# Patient Record
Sex: FEMALE | Race: WHITE | NOT HISPANIC OR LATINO | Employment: PART TIME | URBAN - NONMETROPOLITAN AREA
[De-identification: names, ages, dates, MRNs, and addresses within clinical notes are randomized per-mention and may not be internally consistent; named-entity substitution may affect disease eponyms.]

---

## 2023-07-20 PROBLEM — N89.8 VAGINAL ITCHING: Status: ACTIVE | Noted: 2023-07-20

## 2023-07-20 PROBLEM — K21.9 GERD (GASTROESOPHAGEAL REFLUX DISEASE): Status: ACTIVE | Noted: 2023-07-20

## 2023-07-20 PROBLEM — G47.00 INSOMNIA: Status: ACTIVE | Noted: 2023-07-20

## 2023-07-20 PROBLEM — L29.9 PRURITUS: Status: ACTIVE | Noted: 2023-07-20

## 2023-07-20 PROBLEM — J30.2 SEASONAL ALLERGIES: Status: ACTIVE | Noted: 2023-07-20

## 2023-07-20 PROBLEM — I10 HTN (HYPERTENSION): Status: ACTIVE | Noted: 2023-07-20

## 2023-07-20 PROBLEM — F41.9 ANXIETY: Status: ACTIVE | Noted: 2023-07-20

## 2023-07-20 PROBLEM — J45.909 ASTHMA (HHS-HCC): Status: ACTIVE | Noted: 2023-07-20

## 2023-07-20 RX ORDER — NALTREXONE HYDROCHLORIDE 50 MG/1
1 TABLET, FILM COATED ORAL DAILY
COMMUNITY
Start: 2022-10-06 | End: 2023-09-22 | Stop reason: ALTCHOICE

## 2023-07-20 RX ORDER — PANTOPRAZOLE SODIUM 40 MG/1
1 TABLET, DELAYED RELEASE ORAL DAILY
COMMUNITY
Start: 2022-10-13 | End: 2023-08-11 | Stop reason: SDUPTHER

## 2023-07-20 RX ORDER — TRAZODONE HYDROCHLORIDE 50 MG/1
1 TABLET ORAL NIGHTLY
COMMUNITY
Start: 2022-10-13 | End: 2024-03-01 | Stop reason: SDUPTHER

## 2023-07-20 RX ORDER — LISINOPRIL 10 MG/1
1 TABLET ORAL 2 TIMES DAILY
COMMUNITY
Start: 2022-10-13 | End: 2023-08-11 | Stop reason: SDUPTHER

## 2023-07-20 RX ORDER — MULTIVITAMIN
1 TABLET ORAL DAILY
COMMUNITY
Start: 2022-10-13

## 2023-07-20 RX ORDER — MULTIVIT WITH MINERALS/HERBS
1 TABLET ORAL DAILY
COMMUNITY
Start: 2022-10-13

## 2023-07-20 RX ORDER — TRIAMCINOLONE ACETONIDE 1 MG/G
CREAM TOPICAL 3 TIMES DAILY
COMMUNITY
Start: 2022-10-13

## 2023-08-09 LAB
ALANINE AMINOTRANSFERASE (SGPT) (U/L) IN SER/PLAS: 14 U/L (ref 7–45)
ALBUMIN (G/DL) IN SER/PLAS: 4.1 G/DL (ref 3.4–5)
ALKALINE PHOSPHATASE (U/L) IN SER/PLAS: 81 U/L (ref 33–110)
ANION GAP IN SER/PLAS: 12 MMOL/L (ref 10–20)
ASPARTATE AMINOTRANSFERASE (SGOT) (U/L) IN SER/PLAS: 17 U/L (ref 9–39)
BILIRUBIN TOTAL (MG/DL) IN SER/PLAS: 0.6 MG/DL (ref 0–1.2)
CALCIUM (MG/DL) IN SER/PLAS: 9.1 MG/DL (ref 8.6–10.3)
CARBON DIOXIDE, TOTAL (MMOL/L) IN SER/PLAS: 25 MMOL/L (ref 21–32)
CHLORIDE (MMOL/L) IN SER/PLAS: 102 MMOL/L (ref 98–107)
CREATININE (MG/DL) IN SER/PLAS: 0.74 MG/DL (ref 0.5–1.05)
GFR FEMALE: >90 ML/MIN/1.73M2
GLUCOSE (MG/DL) IN SER/PLAS: 126 MG/DL (ref 74–99)
POTASSIUM (MMOL/L) IN SER/PLAS: 4 MMOL/L (ref 3.5–5.3)
PROTEIN TOTAL: 6 G/DL (ref 6.4–8.2)
SODIUM (MMOL/L) IN SER/PLAS: 135 MMOL/L (ref 136–145)
UREA NITROGEN (MG/DL) IN SER/PLAS: 7 MG/DL (ref 6–23)

## 2023-08-11 ENCOUNTER — OFFICE VISIT (OUTPATIENT)
Dept: PRIMARY CARE | Facility: CLINIC | Age: 58
End: 2023-08-11
Payer: COMMERCIAL

## 2023-08-11 VITALS
WEIGHT: 166.6 LBS | HEIGHT: 68 IN | BODY MASS INDEX: 25.25 KG/M2 | HEART RATE: 78 BPM | DIASTOLIC BLOOD PRESSURE: 70 MMHG | OXYGEN SATURATION: 96 % | SYSTOLIC BLOOD PRESSURE: 110 MMHG

## 2023-08-11 DIAGNOSIS — K91.2 POSTSURGICAL MALABSORPTION (HHS-HCC): ICD-10-CM

## 2023-08-11 DIAGNOSIS — F41.9 ANXIETY: ICD-10-CM

## 2023-08-11 DIAGNOSIS — K21.9 GASTROESOPHAGEAL REFLUX DISEASE WITHOUT ESOPHAGITIS: ICD-10-CM

## 2023-08-11 DIAGNOSIS — M25.59 PAIN IN OTHER JOINT: ICD-10-CM

## 2023-08-11 DIAGNOSIS — J45.40 MODERATE PERSISTENT ASTHMA WITHOUT COMPLICATION (HHS-HCC): Primary | ICD-10-CM

## 2023-08-11 DIAGNOSIS — I10 PRIMARY HYPERTENSION: ICD-10-CM

## 2023-08-11 PROBLEM — N89.8 VAGINAL ITCHING: Status: RESOLVED | Noted: 2023-07-20 | Resolved: 2023-08-11

## 2023-08-11 PROBLEM — L29.9 PRURITUS: Status: RESOLVED | Noted: 2023-07-20 | Resolved: 2023-08-11

## 2023-08-11 PROCEDURE — 99214 OFFICE O/P EST MOD 30 MIN: CPT | Performed by: FAMILY MEDICINE

## 2023-08-11 PROCEDURE — 3074F SYST BP LT 130 MM HG: CPT | Performed by: FAMILY MEDICINE

## 2023-08-11 PROCEDURE — 1036F TOBACCO NON-USER: CPT | Performed by: FAMILY MEDICINE

## 2023-08-11 PROCEDURE — 3078F DIAST BP <80 MM HG: CPT | Performed by: FAMILY MEDICINE

## 2023-08-11 RX ORDER — LORAZEPAM 0.5 MG/1
0.5 TABLET ORAL AS NEEDED
COMMUNITY
Start: 2023-06-16 | End: 2024-05-17 | Stop reason: ALTCHOICE

## 2023-08-11 RX ORDER — FLUTICASONE FUROATE AND VILANTEROL 200; 25 UG/1; UG/1
1 POWDER RESPIRATORY (INHALATION) DAILY
Qty: 1 EACH | Refills: 11 | Status: SHIPPED | OUTPATIENT
Start: 2023-08-11 | End: 2023-10-12

## 2023-08-11 RX ORDER — BUPROPION HYDROCHLORIDE 200 MG/1
200 TABLET, EXTENDED RELEASE ORAL
COMMUNITY
Start: 2023-08-07 | End: 2024-03-01 | Stop reason: SDUPTHER

## 2023-08-11 RX ORDER — PREDNISONE 20 MG/1
40 TABLET ORAL DAILY
Qty: 10 TABLET | Refills: 0 | Status: SHIPPED | OUTPATIENT
Start: 2023-08-11 | End: 2023-08-16

## 2023-08-11 RX ORDER — PANTOPRAZOLE SODIUM 40 MG/1
40 TABLET, DELAYED RELEASE ORAL DAILY
Qty: 90 TABLET | Refills: 3 | Status: SHIPPED | OUTPATIENT
Start: 2023-08-11 | End: 2024-03-01 | Stop reason: SDUPTHER

## 2023-08-11 RX ORDER — LISINOPRIL 20 MG/1
20 TABLET ORAL DAILY
Qty: 90 TABLET | Refills: 3 | Status: SHIPPED | OUTPATIENT
Start: 2023-08-11 | End: 2024-03-01 | Stop reason: SDUPTHER

## 2023-08-11 ASSESSMENT — ENCOUNTER SYMPTOMS
DIARRHEA: 0
COUGH: 1
HEADACHES: 0
DIFFICULTY URINATING: 0
NUMBNESS: 0
WHEEZING: 1
ACTIVITY CHANGE: 0
DIZZINESS: 0
VOMITING: 0
ABDOMINAL PAIN: 0
NAUSEA: 0
LIGHT-HEADEDNESS: 0
SHORTNESS OF BREATH: 1
CONSTIPATION: 0
UNEXPECTED WEIGHT CHANGE: 0
FATIGUE: 0
ADENOPATHY: 0
TROUBLE SWALLOWING: 0
APPETITE CHANGE: 0
RHINORRHEA: 0
ARTHRALGIAS: 1
PALPITATIONS: 0
ABDOMINAL DISTENTION: 0

## 2023-08-11 NOTE — PROGRESS NOTES
"Subjective   Patient ID: Jennifer Perla is a 57 y.o. female who presents for 6 MO Labs.    HPI   No headache, shortness of breath, dizziness, lightheadedness, or edema  Taking PPI daily without breakthrough symptoms.  Reviewed dietary, caffeine, tobacco, alcohol, and NSAID use.  No dyspepsia, dysphagia, reflux, melena, or abdominal pain.    Seeing psych NP, emotionally doing well, no ETOH  No HBP, occ CP, using albuterol more than 2 times a week, daily, no nasal congestion, occ Allegra  No change in exercise tolerance, near syncope at times (once), has noticed in the past couple of months  Patient having multiple joint aches and pains stiffness and soreness.    Review of Systems   Constitutional:  Negative for activity change, appetite change, fatigue and unexpected weight change.   HENT:  Negative for ear pain, nosebleeds, rhinorrhea, sneezing and trouble swallowing.    Respiratory:  Positive for cough, shortness of breath and wheezing.    Cardiovascular:  Negative for chest pain, palpitations and leg swelling.   Gastrointestinal:  Negative for abdominal distention, abdominal pain, constipation, diarrhea, nausea and vomiting.   Genitourinary:  Negative for difficulty urinating.   Musculoskeletal:  Positive for arthralgias.   Skin:  Negative for rash.   Neurological:  Negative for dizziness, light-headedness, numbness and headaches.   Hematological:  Negative for adenopathy.   Psychiatric/Behavioral:  Negative for behavioral problems.    All other systems reviewed and are negative.      Objective   /70   Pulse 78   Ht 1.715 m (5' 7.5\")   Wt 75.6 kg (166 lb 9.6 oz)   SpO2 96%   BMI 25.71 kg/m²     Physical Exam  Vitals and nursing note reviewed.   Constitutional:       General: She is not in acute distress.     Appearance: Normal appearance. She is not toxic-appearing.   HENT:      Head: Normocephalic and atraumatic.      Right Ear: Tympanic membrane, ear canal and external ear normal.      Left Ear: " Tympanic membrane, ear canal and external ear normal.      Nose: Nose normal.      Mouth/Throat:      Mouth: Mucous membranes are moist.      Pharynx: Oropharynx is clear.   Eyes:      Extraocular Movements: Extraocular movements intact.      Conjunctiva/sclera: Conjunctivae normal.      Pupils: Pupils are equal, round, and reactive to light.   Cardiovascular:      Rate and Rhythm: Normal rate and regular rhythm.      Pulses: Normal pulses.      Heart sounds: Normal heart sounds.   Pulmonary:      Effort: Pulmonary effort is normal.      Breath sounds: Normal breath sounds.   Abdominal:      General: Abdomen is flat. Bowel sounds are normal.      Palpations: Abdomen is soft.   Musculoskeletal:      Cervical back: Normal range of motion and neck supple.   Skin:     General: Skin is warm and dry.      Capillary Refill: Capillary refill takes less than 2 seconds.   Neurological:      General: No focal deficit present.      Mental Status: She is alert and oriented to person, place, and time. Mental status is at baseline.   Psychiatric:         Mood and Affect: Mood normal.         Behavior: Behavior normal.         Assessment/Plan   Problem List Items Addressed This Visit       Anxiety     Follows with nurse practitioner, maintained on current medication no longer using alcohol for the past 3 months.         Asthma - Primary     Symptoms more active lately, prednisone for 5 days started on Breo Ellipta and recheck again in 1 month.         Relevant Medications    fluticasone furoate-vilanteroL (Breo Ellipta) 200-25 mcg/dose inhaler    predniSONE (Deltasone) 20 mg tablet    GERD (gastroesophageal reflux disease)     Stable with medication.         Relevant Medications    pantoprazole (ProtoNix) 40 mg EC tablet    HTN (hypertension)     Blood pressure under good control, sodium level slightly low, patient concerned about history of hyponatremia, recheck again in 1 month.  Advised about fluid restriction, patient drinking  a large amount of soda pop through the day, encouraged to drink more clear liquids.         Relevant Medications    lisinopril 20 mg tablet    Other Relevant Orders    Comprehensive Metabolic Panel    Postsurgical malabsorption     Check blood testing in the next month.         Relevant Orders    Comprehensive Metabolic Panel    CBC    Vitamin B12     Other Visit Diagnoses       Pain in other joint        Check labs advised about Tylenol, avoid nonsteroidal anti-inflammatories given history of gastric bypass surgery.    Relevant Orders    Rheumatoid Factor    Citrulline Antibody, IgG    Sedimentation Rate    C-Reactive Protein    GOMEZ with Reflex to EBONI

## 2023-08-11 NOTE — ASSESSMENT & PLAN NOTE
Symptoms more active lately, prednisone for 5 days started on Breo Ellipta and recheck again in 1 month.

## 2023-08-11 NOTE — ASSESSMENT & PLAN NOTE
Follows with nurse practitioner, maintained on current medication no longer using alcohol for the past 3 months.

## 2023-08-11 NOTE — ASSESSMENT & PLAN NOTE
Blood pressure under good control, sodium level slightly low, patient concerned about history of hyponatremia, recheck again in 1 month.  Advised about fluid restriction, patient drinking a large amount of soda pop through the day, encouraged to drink more clear liquids.

## 2023-09-21 ENCOUNTER — OFFICE VISIT (OUTPATIENT)
Dept: PRIMARY CARE | Facility: CLINIC | Age: 58
End: 2023-09-21
Payer: COMMERCIAL

## 2023-09-21 ENCOUNTER — LAB (OUTPATIENT)
Dept: LAB | Facility: LAB | Age: 58
End: 2023-09-21
Payer: COMMERCIAL

## 2023-09-21 VITALS
HEART RATE: 71 BPM | BODY MASS INDEX: 25.88 KG/M2 | WEIGHT: 170.8 LBS | SYSTOLIC BLOOD PRESSURE: 130 MMHG | DIASTOLIC BLOOD PRESSURE: 80 MMHG | OXYGEN SATURATION: 97 % | HEIGHT: 68 IN

## 2023-09-21 DIAGNOSIS — R10.9 FLANK PAIN: Primary | ICD-10-CM

## 2023-09-21 DIAGNOSIS — R10.9 FLANK PAIN: ICD-10-CM

## 2023-09-21 DIAGNOSIS — K91.2 POSTSURGICAL MALABSORPTION (HHS-HCC): ICD-10-CM

## 2023-09-21 DIAGNOSIS — M25.59 PAIN IN OTHER JOINT: ICD-10-CM

## 2023-09-21 DIAGNOSIS — I10 PRIMARY HYPERTENSION: ICD-10-CM

## 2023-09-21 DIAGNOSIS — N30.01 ACUTE CYSTITIS WITH HEMATURIA: ICD-10-CM

## 2023-09-21 LAB
ALANINE AMINOTRANSFERASE (SGPT) (U/L) IN SER/PLAS: 14 U/L (ref 7–45)
ALBUMIN (G/DL) IN SER/PLAS: 4.3 G/DL (ref 3.4–5)
ALKALINE PHOSPHATASE (U/L) IN SER/PLAS: 78 U/L (ref 33–110)
ANION GAP IN SER/PLAS: 12 MMOL/L (ref 10–20)
ASPARTATE AMINOTRANSFERASE (SGOT) (U/L) IN SER/PLAS: 18 U/L (ref 9–39)
BILIRUBIN TOTAL (MG/DL) IN SER/PLAS: 0.6 MG/DL (ref 0–1.2)
C REACTIVE PROTEIN (MG/L) IN SER/PLAS: <0.1 MG/DL
CALCIUM (MG/DL) IN SER/PLAS: 9.3 MG/DL (ref 8.6–10.3)
CARBON DIOXIDE, TOTAL (MMOL/L) IN SER/PLAS: 25 MMOL/L (ref 21–32)
CHLORIDE (MMOL/L) IN SER/PLAS: 101 MMOL/L (ref 98–107)
COBALAMIN (VITAMIN B12) (PG/ML) IN SER/PLAS: 513 PG/ML (ref 211–911)
CREATININE (MG/DL) IN SER/PLAS: 0.73 MG/DL (ref 0.5–1.05)
ERYTHROCYTE DISTRIBUTION WIDTH (RATIO) BY AUTOMATED COUNT: 12.6 % (ref 11.5–14.5)
ERYTHROCYTE MEAN CORPUSCULAR HEMOGLOBIN CONCENTRATION (G/DL) BY AUTOMATED: 32.6 G/DL (ref 32–36)
ERYTHROCYTE MEAN CORPUSCULAR VOLUME (FL) BY AUTOMATED COUNT: 96 FL (ref 80–100)
ERYTHROCYTES (10*6/UL) IN BLOOD BY AUTOMATED COUNT: 4.3 X10E12/L (ref 4–5.2)
GFR FEMALE: >90 ML/MIN/1.73M2
GLUCOSE (MG/DL) IN SER/PLAS: 141 MG/DL (ref 74–99)
HEMATOCRIT (%) IN BLOOD BY AUTOMATED COUNT: 41.4 % (ref 36–46)
HEMOGLOBIN (G/DL) IN BLOOD: 13.5 G/DL (ref 12–16)
LEUKOCYTES (10*3/UL) IN BLOOD BY AUTOMATED COUNT: 3.6 X10E9/L (ref 4.4–11.3)
PLATELETS (10*3/UL) IN BLOOD AUTOMATED COUNT: 186 X10E9/L (ref 150–450)
POC APPEARANCE, URINE: ABNORMAL
POC BILIRUBIN, URINE: NEGATIVE
POC BLOOD, URINE: ABNORMAL
POC COLOR, URINE: ABNORMAL
POC GLUCOSE, URINE: NEGATIVE MG/DL
POC KETONES, URINE: NEGATIVE MG/DL
POC LEUKOCYTES, URINE: ABNORMAL
POC NITRITE,URINE: NEGATIVE
POC PH, URINE: 6 PH
POC PROTEIN, URINE: NEGATIVE MG/DL
POC SPECIFIC GRAVITY, URINE: 1.01
POC UROBILINOGEN, URINE: 0.2 EU/DL
POTASSIUM (MMOL/L) IN SER/PLAS: 4.2 MMOL/L (ref 3.5–5.3)
PROTEIN TOTAL: 6.3 G/DL (ref 6.4–8.2)
SEDIMENTATION RATE, ERYTHROCYTE: 5 MM/H (ref 0–30)
SODIUM (MMOL/L) IN SER/PLAS: 134 MMOL/L (ref 136–145)
UREA NITROGEN (MG/DL) IN SER/PLAS: 8 MG/DL (ref 6–23)

## 2023-09-21 PROCEDURE — 81003 URINALYSIS AUTO W/O SCOPE: CPT | Performed by: NURSE PRACTITIONER

## 2023-09-21 PROCEDURE — 85652 RBC SED RATE AUTOMATED: CPT

## 2023-09-21 PROCEDURE — 86200 CCP ANTIBODY: CPT

## 2023-09-21 PROCEDURE — 87077 CULTURE AEROBIC IDENTIFY: CPT

## 2023-09-21 PROCEDURE — 82607 VITAMIN B-12: CPT

## 2023-09-21 PROCEDURE — 86431 RHEUMATOID FACTOR QUANT: CPT

## 2023-09-21 PROCEDURE — 3079F DIAST BP 80-89 MM HG: CPT | Performed by: NURSE PRACTITIONER

## 2023-09-21 PROCEDURE — 99214 OFFICE O/P EST MOD 30 MIN: CPT | Performed by: NURSE PRACTITIONER

## 2023-09-21 PROCEDURE — 86038 ANTINUCLEAR ANTIBODIES: CPT

## 2023-09-21 PROCEDURE — 87186 SC STD MICRODIL/AGAR DIL: CPT

## 2023-09-21 PROCEDURE — 1036F TOBACCO NON-USER: CPT | Performed by: NURSE PRACTITIONER

## 2023-09-21 PROCEDURE — 87086 URINE CULTURE/COLONY COUNT: CPT

## 2023-09-21 PROCEDURE — 80053 COMPREHEN METABOLIC PANEL: CPT

## 2023-09-21 PROCEDURE — 3075F SYST BP GE 130 - 139MM HG: CPT | Performed by: NURSE PRACTITIONER

## 2023-09-21 PROCEDURE — 36415 COLL VENOUS BLD VENIPUNCTURE: CPT

## 2023-09-21 PROCEDURE — 85027 COMPLETE CBC AUTOMATED: CPT

## 2023-09-21 PROCEDURE — 86140 C-REACTIVE PROTEIN: CPT

## 2023-09-21 RX ORDER — NITROFURANTOIN 25; 75 MG/1; MG/1
100 CAPSULE ORAL 2 TIMES DAILY
Qty: 14 CAPSULE | Refills: 0 | Status: SHIPPED | OUTPATIENT
Start: 2023-09-21 | End: 2023-09-25

## 2023-09-21 ASSESSMENT — ENCOUNTER SYMPTOMS
DYSURIA: 1
HEMATURIA: 0
CHILLS: 0
PARESIS: 0
CONSTIPATION: 0
VOMITING: 0
PERIANAL NUMBNESS: 0
NAUSEA: 0
FLANK PAIN: 1
DIARRHEA: 0
DIFFICULTY URINATING: 0
FEVER: 0
ABDOMINAL PAIN: 1

## 2023-09-21 ASSESSMENT — PATIENT HEALTH QUESTIONNAIRE - PHQ9
1. LITTLE INTEREST OR PLEASURE IN DOING THINGS: NOT AT ALL
2. FEELING DOWN, DEPRESSED OR HOPELESS: NOT AT ALL
SUM OF ALL RESPONSES TO PHQ9 QUESTIONS 1 AND 2: 0

## 2023-09-21 NOTE — PROGRESS NOTES
"Subjective   Patient ID: Jennifer Perla is a 57 y.o. female who presents for possible kidney infection (Pain started Monday night, dull ache since july).    Ache lower back started in July and by Mon night started to worsen on R flank region w/ radiation to R lower abd  No h/o renal stones  -hematuria  No fever/chills/nausea/diarrhea/vomiting  Hard to start urine stream  No chg in freq./  IO UA: blood trace  Leuk: large    Flank Pain  This is a new problem. The current episode started more than 1 month ago. The problem occurs constantly. The problem has been gradually worsening since onset. Pain location: R flank. The quality of the pain is described as stabbing. Radiates to: RLQ. The pain is moderate. The pain is The same all the time. Exacerbated by: nothing. Associated symptoms include abdominal pain, dysuria and pelvic pain. Pertinent negatives include no bladder incontinence, fever, paresis or perianal numbness. She has tried analgesics for the symptoms. The treatment provided no relief.        Review of Systems   Constitutional:  Negative for chills and fever.   Gastrointestinal:  Positive for abdominal pain. Negative for constipation, diarrhea, nausea and vomiting.   Genitourinary:  Positive for dysuria, flank pain and pelvic pain. Negative for bladder incontinence, difficulty urinating, hematuria, vaginal bleeding, vaginal discharge and vaginal pain.       Objective   /80   Pulse 71   Ht 1.715 m (5' 7.5\")   Wt 77.5 kg (170 lb 12.8 oz)   SpO2 97%   BMI 26.36 kg/m²     Physical Exam  Vitals reviewed.   Constitutional:       General: She is in acute distress.      Appearance: She is not ill-appearing, toxic-appearing or diaphoretic.   Cardiovascular:      Rate and Rhythm: Normal rate and regular rhythm.      Heart sounds: Normal heart sounds.   Pulmonary:      Effort: Pulmonary effort is normal.      Breath sounds: Normal breath sounds.   Abdominal:      General: Abdomen is flat. Bowel sounds are " decreased.      Palpations: Abdomen is soft.      Tenderness: There is abdominal tenderness in the right lower quadrant. There is no right CVA tenderness or left CVA tenderness.   Musculoskeletal:      Right lower leg: No edema.      Left lower leg: No edema.   Neurological:      Mental Status: She is alert.   Psychiatric:         Behavior: Behavior is cooperative.         Assessment/Plan   Problem List Items Addressed This Visit       Acute cystitis with hematuria    Relevant Medications    nitrofurantoin, macrocrystal-monohydrate, (Macrobid) 100 mg capsule    Other Relevant Orders    Urine Culture    POCT UA Automated manually resulted (Completed)    Flank pain - Primary    Relevant Medications    nitrofurantoin, macrocrystal-monohydrate, (Macrobid) 100 mg capsule    Other Relevant Orders    CT abdomen pelvis wo IV contrast    Basic Metabolic Panel    Urine Culture    CBC

## 2023-09-21 NOTE — RESULT ENCOUNTER NOTE
Called and spoke with patient regarding her CT of the abdomen and pelvis.  I did propose an order for a renal ultrasound which will need completed in approximately 2 weeks.  Have her take Anoro and may alternate with ibuprofen for pain.  With the naltrexone it blocks the effects of any narcotic that I would get rendering it ineffective.

## 2023-09-22 ENCOUNTER — TELEPHONE (OUTPATIENT)
Dept: PRIMARY CARE | Facility: CLINIC | Age: 58
End: 2023-09-22

## 2023-09-22 DIAGNOSIS — R10.9 FLANK PAIN: Primary | ICD-10-CM

## 2023-09-22 LAB
ANTI-NUCLEAR ANTIBODY (ANA): NEGATIVE
CITRULLINE ANTIBODY, IGG: <1 U/ML
RHEUMATOID FACTOR (IU/ML) IN SERUM OR PLASMA: <10 IU/ML (ref 0–15)

## 2023-09-22 RX ORDER — OXYCODONE AND ACETAMINOPHEN 5; 325 MG/1; MG/1
1 TABLET ORAL EVERY 6 HOURS PRN
Qty: 28 TABLET | Refills: 0 | Status: SHIPPED | OUTPATIENT
Start: 2023-09-22 | End: 2023-09-29

## 2023-09-22 NOTE — TELEPHONE ENCOUNTER
Prescription for percocet sent to pharmacy. If pain not under control w/ this medication or is accompanied w/ fever/chilling/nausea/vomiting needs evaluated in ER

## 2023-09-22 NOTE — TELEPHONE ENCOUNTER
Pt called in very tearful and upset with her pain level, she states she is no longer taking naltrexone and requests something stronger for pain as ibu and tylenol are not helping

## 2023-09-23 LAB — URINE CULTURE: ABNORMAL

## 2023-09-25 ENCOUNTER — TELEPHONE (OUTPATIENT)
Dept: PRIMARY CARE | Facility: CLINIC | Age: 58
End: 2023-09-25

## 2023-09-25 DIAGNOSIS — N30.01 ACUTE CYSTITIS WITH HEMATURIA: Primary | ICD-10-CM

## 2023-09-25 RX ORDER — AMOXICILLIN AND CLAVULANATE POTASSIUM 875; 125 MG/1; MG/1
875 TABLET, FILM COATED ORAL
Qty: 14 TABLET | Refills: 0 | Status: SHIPPED | OUTPATIENT
Start: 2023-09-25 | End: 2023-10-02

## 2023-09-25 NOTE — RESULT ENCOUNTER NOTE
I will place her on Augmentin to treat her UTI.  There is a higher risk with this antibiotic with C. difficile.  I encouraged her to try a probiotic which may help.

## 2023-09-25 NOTE — RESULT ENCOUNTER NOTE
: Spoke with patient regarding her urine culture results.  States her pain is much better and tolerable.  She denies fever or chilling.  She will call with a phone number for a pharmacy so I can send an antibiotic to.  She is currently on vacation in [facility].  She will also make an appointment to be seen by Dr. Black to review her labs and renal ultrasound results.

## 2023-10-04 ENCOUNTER — TELEPHONE (OUTPATIENT)
Dept: PRIMARY CARE | Facility: CLINIC | Age: 58
End: 2023-10-04

## 2023-10-04 NOTE — TELEPHONE ENCOUNTER
Pt lmom that she is still having pain after finishing antibiotics. Pt wonders if she should come in for a nurse visit to do a urine. Pt has an appt 10/12

## 2023-10-05 ENCOUNTER — CLINICAL SUPPORT (OUTPATIENT)
Dept: PRIMARY CARE | Facility: CLINIC | Age: 58
End: 2023-10-05
Payer: COMMERCIAL

## 2023-10-05 DIAGNOSIS — R10.9 FLANK PAIN: ICD-10-CM

## 2023-10-05 LAB
POC APPEARANCE, URINE: CLEAR
POC BILIRUBIN, URINE: NEGATIVE
POC BLOOD, URINE: ABNORMAL
POC COLOR, URINE: YELLOW
POC GLUCOSE, URINE: NEGATIVE MG/DL
POC KETONES, URINE: NEGATIVE MG/DL
POC LEUKOCYTES, URINE: ABNORMAL
POC NITRITE,URINE: NEGATIVE
POC PH, URINE: 6 PH
POC PROTEIN, URINE: NEGATIVE MG/DL
POC SPECIFIC GRAVITY, URINE: 1.01
POC UROBILINOGEN, URINE: 0.2 EU/DL

## 2023-10-05 PROCEDURE — 87086 URINE CULTURE/COLONY COUNT: CPT

## 2023-10-05 PROCEDURE — 81003 URINALYSIS AUTO W/O SCOPE: CPT | Performed by: FAMILY MEDICINE

## 2023-10-05 NOTE — PROGRESS NOTES
Patient ID: Jennifer Perla is a 58 y.o. female.    ProceduresSubjective   Patient ID: Jennifer Perla is a 58 y.o. female who presents for No chief complaint on file..    HPI     Review of Systems    Objective   There were no vitals taken for this visit.    Physical Exam    Assessment/Plan

## 2023-10-05 NOTE — PROGRESS NOTES
Pt wants to make sure that you are aware that she is susceptible to C diff. Patient brought a urine sample into the office today for Flank pain. Clarice Gonzalez ran urine test strip for POCT UA. Results forwarded to PCP.

## 2023-10-06 LAB — BACTERIA UR CULT: NORMAL

## 2023-10-12 ENCOUNTER — OFFICE VISIT (OUTPATIENT)
Dept: PRIMARY CARE | Facility: CLINIC | Age: 58
End: 2023-10-12
Payer: COMMERCIAL

## 2023-10-12 ENCOUNTER — LAB (OUTPATIENT)
Dept: LAB | Facility: LAB | Age: 58
End: 2023-10-12
Payer: COMMERCIAL

## 2023-10-12 ENCOUNTER — TELEPHONE (OUTPATIENT)
Dept: PRIMARY CARE | Facility: CLINIC | Age: 58
End: 2023-10-12

## 2023-10-12 ENCOUNTER — HOSPITAL ENCOUNTER (OUTPATIENT)
Dept: RADIOLOGY | Facility: HOSPITAL | Age: 58
Discharge: HOME | End: 2023-10-12
Payer: COMMERCIAL

## 2023-10-12 VITALS
HEART RATE: 71 BPM | WEIGHT: 175.8 LBS | DIASTOLIC BLOOD PRESSURE: 70 MMHG | HEIGHT: 68 IN | BODY MASS INDEX: 26.64 KG/M2 | SYSTOLIC BLOOD PRESSURE: 110 MMHG | OXYGEN SATURATION: 96 %

## 2023-10-12 DIAGNOSIS — I10 PRIMARY HYPERTENSION: Primary | ICD-10-CM

## 2023-10-12 DIAGNOSIS — J30.2 SEASONAL ALLERGIES: ICD-10-CM

## 2023-10-12 DIAGNOSIS — K86.89 PANCREATIC MASS (HHS-HCC): ICD-10-CM

## 2023-10-12 DIAGNOSIS — F51.01 PRIMARY INSOMNIA: ICD-10-CM

## 2023-10-12 DIAGNOSIS — R10.9 FLANK PAIN: ICD-10-CM

## 2023-10-12 DIAGNOSIS — I10 PRIMARY HYPERTENSION: ICD-10-CM

## 2023-10-12 DIAGNOSIS — K91.2 POSTSURGICAL MALABSORPTION (HHS-HCC): ICD-10-CM

## 2023-10-12 DIAGNOSIS — K21.9 GASTROESOPHAGEAL REFLUX DISEASE WITHOUT ESOPHAGITIS: ICD-10-CM

## 2023-10-12 DIAGNOSIS — M25.50 ARTHRALGIA, UNSPECIFIED JOINT: ICD-10-CM

## 2023-10-12 DIAGNOSIS — F41.9 ANXIETY: ICD-10-CM

## 2023-10-12 DIAGNOSIS — J45.20 MILD INTERMITTENT ASTHMA WITHOUT COMPLICATION (HHS-HCC): ICD-10-CM

## 2023-10-12 PROBLEM — N30.01 ACUTE CYSTITIS WITH HEMATURIA: Status: RESOLVED | Noted: 2023-09-21 | Resolved: 2023-10-12

## 2023-10-12 LAB
ALBUMIN SERPL BCP-MCNC: 4.1 G/DL (ref 3.4–5)
ALP SERPL-CCNC: 76 U/L (ref 33–136)
ALT SERPL W P-5'-P-CCNC: 14 U/L (ref 7–45)
ANION GAP SERPL CALC-SCNC: 9 MMOL/L (ref 10–20)
AST SERPL W P-5'-P-CCNC: 16 U/L (ref 9–39)
BILIRUB SERPL-MCNC: 0.6 MG/DL (ref 0–1.2)
BUN SERPL-MCNC: 8 MG/DL (ref 6–23)
CALCIUM SERPL-MCNC: 9 MG/DL (ref 8.6–10.3)
CANCER AG19-9 SERPL-ACNC: 11.66 U/ML
CHLORIDE SERPL-SCNC: 101 MMOL/L (ref 98–107)
CO2 SERPL-SCNC: 28 MMOL/L (ref 21–32)
CREAT SERPL-MCNC: 0.63 MG/DL (ref 0.5–1.05)
GFR SERPL CREATININE-BSD FRML MDRD: >90 ML/MIN/1.73M*2
GLUCOSE SERPL-MCNC: 87 MG/DL (ref 74–99)
POTASSIUM SERPL-SCNC: 4.7 MMOL/L (ref 3.5–5.3)
PROT SERPL-MCNC: 6.2 G/DL (ref 6.4–8.2)
SODIUM SERPL-SCNC: 133 MMOL/L (ref 136–145)

## 2023-10-12 PROCEDURE — 36415 COLL VENOUS BLD VENIPUNCTURE: CPT

## 2023-10-12 PROCEDURE — 1036F TOBACCO NON-USER: CPT | Performed by: FAMILY MEDICINE

## 2023-10-12 PROCEDURE — 86301 IMMUNOASSAY TUMOR CA 19-9: CPT

## 2023-10-12 PROCEDURE — 76770 US EXAM ABDO BACK WALL COMP: CPT

## 2023-10-12 PROCEDURE — 99214 OFFICE O/P EST MOD 30 MIN: CPT | Performed by: FAMILY MEDICINE

## 2023-10-12 PROCEDURE — 80053 COMPREHEN METABOLIC PANEL: CPT

## 2023-10-12 PROCEDURE — 76770 US EXAM ABDO BACK WALL COMP: CPT | Performed by: RADIOLOGY

## 2023-10-12 PROCEDURE — 3078F DIAST BP <80 MM HG: CPT | Performed by: FAMILY MEDICINE

## 2023-10-12 PROCEDURE — 3074F SYST BP LT 130 MM HG: CPT | Performed by: FAMILY MEDICINE

## 2023-10-12 ASSESSMENT — ENCOUNTER SYMPTOMS
LIGHT-HEADEDNESS: 0
VOMITING: 0
APPETITE CHANGE: 0
CONSTIPATION: 0
ABDOMINAL PAIN: 1
FATIGUE: 1
DECREASED CONCENTRATION: 0
SHORTNESS OF BREATH: 0
TROUBLE SWALLOWING: 0
DIARRHEA: 0
PALPITATIONS: 0
NUMBNESS: 0
DYSPHORIC MOOD: 0
DIFFICULTY URINATING: 0
RHINORRHEA: 0
ADENOPATHY: 0
WHEEZING: 0
ABDOMINAL DISTENTION: 0
DIZZINESS: 0
NERVOUS/ANXIOUS: 0
ARTHRALGIAS: 1
ACTIVITY CHANGE: 0
COUGH: 0
SLEEP DISTURBANCE: 0
HEADACHES: 0
UNEXPECTED WEIGHT CHANGE: 0
NAUSEA: 0

## 2023-10-12 NOTE — ASSESSMENT & PLAN NOTE
Seems to be doing well, was unable to afford Breo elliptica, asthma does seem to be settled after prednisone taper.

## 2023-10-12 NOTE — PROGRESS NOTES
Subjective   Patient ID: Jennifer Perla is a 58 y.o. female who presents for 1 MO LABS.    HPI   No headache, chest pain, shortness of breath, dizziness, lightheadedness, or edema  Has not been using Breo, not using albuterol as much  Joint pain, no different with steroids, feels stiff and sore, usually worse when less active  Right flank pain since July  Taking PPI daily without breakthrough symptoms.  Reviewed dietary, caffeine, tobacco, alcohol, and NSAID use.  No dyspepsia, dysphagia, reflux, melena, or abdominal pain.  Emotionally doing OK, sleeping OK at night    Review of Systems   Constitutional:  Positive for fatigue. Negative for activity change, appetite change and unexpected weight change.   HENT:  Negative for ear pain, nosebleeds, rhinorrhea, sneezing and trouble swallowing.    Respiratory:  Negative for cough, shortness of breath and wheezing.    Cardiovascular:  Negative for chest pain, palpitations and leg swelling.   Gastrointestinal:  Positive for abdominal pain. Negative for abdominal distention, constipation, diarrhea, nausea and vomiting.   Genitourinary:  Negative for difficulty urinating.   Musculoskeletal:  Positive for arthralgias. Negative for gait problem.   Skin:  Negative for rash.   Neurological:  Negative for dizziness, light-headedness, numbness and headaches.   Hematological:  Negative for adenopathy.   Psychiatric/Behavioral:  Negative for behavioral problems, decreased concentration, dysphoric mood and sleep disturbance. The patient is not nervous/anxious.    All other systems reviewed and are negative.      Current Outpatient Medications:     buPROPion SR (Wellbutrin SR) 200 mg 12 hr tablet, Take 1 tablet (200 mg) by mouth once daily in the morning. Take before meals., Disp: , Rfl:     lisinopril 20 mg tablet, Take 1 tablet (20 mg) by mouth once daily., Disp: 90 tablet, Rfl: 3    LORazepam (Ativan) 0.5 mg tablet, Take 1 tablet (0.5 mg) by mouth if needed for anxiety., Disp: , Rfl:  "    multivitamin tablet, Take 1 tablet by mouth once daily., Disp: , Rfl:     pantoprazole (ProtoNix) 40 mg EC tablet, Take 1 tablet (40 mg) by mouth once daily. (Patient taking differently: Take 1 tablet (40 mg) by mouth every other day.), Disp: 90 tablet, Rfl: 3    traZODone (Desyrel) 50 mg tablet, Take 1 tablet (50 mg) by mouth once daily at bedtime., Disp: , Rfl:     triamcinolone (Kenalog) 0.1 % cream, Apply topically 3 times a day. APPLY SPARINGLY TO AFFECTED AREA(S) 3 TIMES A DAY., Disp: , Rfl:     vitamin B complex (Vitamins B Complex) tablet, Take 1 tablet by mouth once daily., Disp: , Rfl:     fluticasone furoate-vilanteroL (Breo Ellipta) 200-25 mcg/dose inhaler, Inhale 1 puff once daily., Disp: 1 each, Rfl: 11      Objective   /70   Pulse 71   Ht 1.715 m (5' 7.5\")   Wt 79.7 kg (175 lb 12.8 oz)   SpO2 96%   BMI 27.13 kg/m²     Physical Exam  Vitals and nursing note reviewed.   Constitutional:       General: She is not in acute distress.     Appearance: Normal appearance. She is not toxic-appearing.   HENT:      Head: Normocephalic and atraumatic.      Right Ear: Tympanic membrane, ear canal and external ear normal.      Left Ear: Tympanic membrane, ear canal and external ear normal.      Nose: Nose normal.      Mouth/Throat:      Mouth: Mucous membranes are moist.      Pharynx: Oropharynx is clear.   Eyes:      Extraocular Movements: Extraocular movements intact.      Conjunctiva/sclera: Conjunctivae normal.      Pupils: Pupils are equal, round, and reactive to light.   Cardiovascular:      Rate and Rhythm: Normal rate and regular rhythm.      Pulses: Normal pulses.      Heart sounds: Normal heart sounds.   Pulmonary:      Effort: Pulmonary effort is normal.      Breath sounds: Normal breath sounds.   Abdominal:      General: Abdomen is flat. Bowel sounds are normal.      Palpations: Abdomen is soft.   Musculoskeletal:      Cervical back: Normal range of motion and neck supple.   Skin:     " General: Skin is warm and dry.      Capillary Refill: Capillary refill takes less than 2 seconds.   Neurological:      General: No focal deficit present.      Mental Status: She is alert and oriented to person, place, and time. Mental status is at baseline.   Psychiatric:         Mood and Affect: Mood normal.         Behavior: Behavior normal.         Assessment/Plan   Problem List Items Addressed This Visit             ICD-10-CM    Anxiety F41.9     Seems to be stable currently with medication.         Asthma J45.909     Seems to be stable currently was unable to afford maintenance medication.         GERD (gastroesophageal reflux disease) K21.9     Stable currently with medication.         HTN (hypertension) - Primary I10     Blood pressure under good control, recent renal functions are normal along with electrolytes.  No change with medicine.         Relevant Orders    Comprehensive Metabolic Panel (Completed)    Insomnia G47.00    Seasonal allergies J30.2     Seems to be doing well, was unable to afford Breo elliptica, asthma does seem to be settled after prednisone taper.         Postsurgical malabsorption K91.2     Blood testing reviewed with the patient all appears to be normal.         Pancreatic mass K86.89     Abnormality seen on CT scan at the head of the pancreas, check CA 19-9 and MRCP.         Relevant Orders    Follow Up In Primary Care - Established    Cancer Antigen 19-9    Comprehensive Metabolic Panel (Completed)    MRCP pancreas w IV contrast    Arthralgia M25.50     Rheumatologic blood testing screening was all normal, is complaining of stiffness and soreness especially at rest, some deformity in her hands, will refer to rheumatology for further evaluation.         Relevant Orders    Referral to Rheumatology

## 2023-10-12 NOTE — ASSESSMENT & PLAN NOTE
Rheumatologic blood testing screening was all normal, is complaining of stiffness and soreness especially at rest, some deformity in her hands, will refer to rheumatology for further evaluation.

## 2023-10-12 NOTE — ASSESSMENT & PLAN NOTE
Blood pressure under good control, recent renal functions are normal along with electrolytes.  No change with medicine.

## 2023-10-13 ENCOUNTER — TELEPHONE (OUTPATIENT)
Dept: PRIMARY CARE | Facility: CLINIC | Age: 58
End: 2023-10-13

## 2023-10-13 DIAGNOSIS — F40.240 CLAUSTROPHOBIA: Primary | ICD-10-CM

## 2023-10-13 RX ORDER — LORAZEPAM 0.5 MG/1
TABLET ORAL
Qty: 4 TABLET | Refills: 0 | Status: SHIPPED | OUTPATIENT
Start: 2023-10-13 | End: 2024-05-17 | Stop reason: WASHOUT

## 2023-10-13 NOTE — TELEPHONE ENCOUNTER
PATIENT CALEB  FOR RAHEEM 10-19-23.   PATIENT IS CLAUSTROPHOBIC.    W[facility]   PLEASE

## 2023-10-13 NOTE — TELEPHONE ENCOUNTER
I sent a small prescription for lorazepam to her pharmacy for her, she has had this before.  Instructions are to take 1 about an hour before the scan and take 1 at the time of the scan.

## 2023-10-16 NOTE — RESULT ENCOUNTER NOTE
Call & notify pt her Ultrasound of kidneys shows no stone/fluid in kidneys. Some residual in bladder after urinating otherwise it is normal

## 2023-10-19 ENCOUNTER — HOSPITAL ENCOUNTER (OUTPATIENT)
Dept: RADIOLOGY | Facility: HOSPITAL | Age: 58
Discharge: HOME | End: 2023-10-19
Payer: COMMERCIAL

## 2023-10-19 DIAGNOSIS — K86.89 PANCREATIC MASS (HHS-HCC): ICD-10-CM

## 2023-10-19 PROCEDURE — 2550000001 HC RX 255 CONTRASTS: Performed by: FAMILY MEDICINE

## 2023-10-19 PROCEDURE — 74183 MRI ABD W/O CNTR FLWD CNTR: CPT

## 2023-10-19 PROCEDURE — 74183 MRI ABD W/O CNTR FLWD CNTR: CPT | Performed by: RADIOLOGY

## 2023-10-19 PROCEDURE — 76376 3D RENDER W/INTRP POSTPROCES: CPT | Performed by: RADIOLOGY

## 2023-10-19 PROCEDURE — A9575 INJ GADOTERATE MEGLUMI 0.1ML: HCPCS | Performed by: FAMILY MEDICINE

## 2023-10-19 RX ORDER — GADOTERATE MEGLUMINE 376.9 MG/ML
16 INJECTION INTRAVENOUS
Status: COMPLETED | OUTPATIENT
Start: 2023-10-19 | End: 2023-10-19

## 2023-10-19 RX ADMIN — GADOTERATE MEGLUMINE 16 ML: 376.9 INJECTION INTRAVENOUS at 17:59

## 2023-10-23 NOTE — RESULT ENCOUNTER NOTE
Please let the patient know that her MRCP does not show any issues with the pancreas, drainage system from the liver, does show a small dilation of the small bowel.  If she is still having some pain, may consider having her see general surgery for further evaluation.

## 2023-10-25 ENCOUNTER — TELEPHONE (OUTPATIENT)
Dept: PRIMARY CARE | Facility: CLINIC | Age: 58
End: 2023-10-25

## 2023-10-25 NOTE — TELEPHONE ENCOUNTER
----- Message from Devaughn Black MD sent at 10/23/2023 12:58 PM EDT -----  Please let the patient know that her MRCP does not show any issues with the pancreas, drainage system from the liver, does show a small dilation of the small bowel.  If she is still having some pain, may consider having her see general surgery for further evaluation.

## 2023-10-25 NOTE — TELEPHONE ENCOUNTER
"Spoke to pt and gave her the message and she is wondering, what this means \" partially imaged dilated small bowel involving a loop in the midline at the pelvic inlet. There is also a  partially imaged intussusception at the site.\" Is it normal or should she follow up with someone.   "

## 2023-10-25 NOTE — TELEPHONE ENCOUNTER
This is why if her pain is still an issue, I would like her to consult with general surgery.  This may be nothing, but if she is still having pain it needs further evaluation.

## 2023-11-13 ENCOUNTER — TELEPHONE (OUTPATIENT)
Dept: PRIMARY CARE | Facility: CLINIC | Age: 58
End: 2023-11-13

## 2023-11-13 DIAGNOSIS — J45.20 MILD INTERMITTENT ASTHMA WITHOUT COMPLICATION (HHS-HCC): Primary | ICD-10-CM

## 2023-11-13 RX ORDER — PREDNISONE 10 MG/1
TABLET ORAL
Qty: 30 TABLET | Refills: 0 | Status: SHIPPED | OUTPATIENT
Start: 2023-11-13 | End: 2023-11-24

## 2023-11-16 ENCOUNTER — APPOINTMENT (OUTPATIENT)
Dept: PRIMARY CARE | Facility: CLINIC | Age: 58
End: 2023-11-16

## 2023-12-07 ENCOUNTER — APPOINTMENT (OUTPATIENT)
Dept: PRIMARY CARE | Facility: CLINIC | Age: 58
End: 2023-12-07

## 2023-12-19 ENCOUNTER — TELEPHONE (OUTPATIENT)
Dept: PRIMARY CARE | Facility: CLINIC | Age: 58
End: 2023-12-19

## 2023-12-19 DIAGNOSIS — J45.21 MILD INTERMITTENT ASTHMA WITH ACUTE EXACERBATION (HHS-HCC): Primary | ICD-10-CM

## 2023-12-19 RX ORDER — PREDNISONE 10 MG/1
TABLET ORAL
Qty: 30 TABLET | Refills: 0 | Status: SHIPPED | OUTPATIENT
Start: 2023-12-19 | End: 2023-12-30

## 2023-12-19 RX ORDER — ALBUTEROL SULFATE 5 MG/ML
2.5 SOLUTION RESPIRATORY (INHALATION) EVERY 4 HOURS
COMMUNITY

## 2023-12-19 NOTE — TELEPHONE ENCOUNTER
I will place her on a prednisone taper to settle her asthma flare.  If she test positive for COVID-19, we can send in Paxlovid.

## 2023-12-19 NOTE — TELEPHONE ENCOUNTER
will place her on a prednisone taper to settle her asthma flare.  If she test positive for COVID-19, we can send in Paxlovid.         Note        You routed conversation to Devaughn Black MD6 hours ago (8:26 AM)     You6 hours ago (8:26 AM)       PATIENT CALLED TO REPORT SHE IS USING RESCUE INHALER MORE OFTEN FOR SOB AND WHEEZING. STATES ALLERGIC TO MARILOU TREES. SAW MOTHER ON FRIDAY, MOTHER IS POSITIVE FOR COVID. PLEASE ADVISE      LEFT DETAILED MESSAGE ON VOICE MAIL. PATIENT INSTRUCTED TO CALL IF NOT BETTER AND/OR COVID POSITIVE

## 2023-12-19 NOTE — TELEPHONE ENCOUNTER
PATIENT CALLED TO REPORT SHE IS USING RESCUE INHALER MORE OFTEN FOR SOB AND WHEEZING. STATES ALLERGIC TO MARILOU TREES. SAW MOTHER ON FRIDAY, MOTHER IS POSITIVE FOR COVID. PLEASE ADVISE

## 2024-02-15 ENCOUNTER — HOSPITAL ENCOUNTER (OUTPATIENT)
Dept: RADIOLOGY | Facility: HOSPITAL | Age: 59
Discharge: HOME | End: 2024-02-15
Payer: COMMERCIAL

## 2024-02-15 ENCOUNTER — LAB (OUTPATIENT)
Dept: LAB | Facility: LAB | Age: 59
End: 2024-02-15
Payer: COMMERCIAL

## 2024-02-15 DIAGNOSIS — M79.641 PAIN IN RIGHT HAND: ICD-10-CM

## 2024-02-15 DIAGNOSIS — M75.51 BURSITIS OF RIGHT SHOULDER: ICD-10-CM

## 2024-02-15 DIAGNOSIS — M19.071 PRIMARY OSTEOARTHRITIS, RIGHT ANKLE AND FOOT: ICD-10-CM

## 2024-02-15 DIAGNOSIS — Z96.641 PRESENCE OF RIGHT ARTIFICIAL HIP JOINT: ICD-10-CM

## 2024-02-15 DIAGNOSIS — D72.819 DECREASED WHITE BLOOD CELL COUNT, UNSPECIFIED: ICD-10-CM

## 2024-02-15 DIAGNOSIS — I10 ESSENTIAL (PRIMARY) HYPERTENSION: ICD-10-CM

## 2024-02-15 DIAGNOSIS — M25.512 PAIN IN LEFT SHOULDER: ICD-10-CM

## 2024-02-15 DIAGNOSIS — M50.30 OTHER CERVICAL DISC DEGENERATION, UNSPECIFIED CERVICAL REGION: ICD-10-CM

## 2024-02-15 DIAGNOSIS — M54.2 CERVICALGIA: ICD-10-CM

## 2024-02-15 DIAGNOSIS — M25.532 PAIN IN LEFT WRIST: ICD-10-CM

## 2024-02-15 DIAGNOSIS — R53.83 OTHER FATIGUE: ICD-10-CM

## 2024-02-15 DIAGNOSIS — M25.531 PAIN IN RIGHT WRIST: ICD-10-CM

## 2024-02-15 DIAGNOSIS — M19.031 PRIMARY OSTEOARTHRITIS, RIGHT WRIST: ICD-10-CM

## 2024-02-15 DIAGNOSIS — M79.642 PAIN IN LEFT HAND: ICD-10-CM

## 2024-02-15 DIAGNOSIS — M25.511 PAIN IN RIGHT SHOULDER: ICD-10-CM

## 2024-02-15 DIAGNOSIS — M75.52 BURSITIS OF LEFT SHOULDER: ICD-10-CM

## 2024-02-15 DIAGNOSIS — E87.1 HYPO-OSMOLALITY AND HYPONATREMIA: ICD-10-CM

## 2024-02-15 DIAGNOSIS — Z79.1 LONG TERM (CURRENT) USE OF NON-STEROIDAL ANTI-INFLAMMATORIES (NSAID): ICD-10-CM

## 2024-02-15 DIAGNOSIS — E77.8 OTHER DISORDERS OF GLYCOPROTEIN METABOLISM (MULTI): ICD-10-CM

## 2024-02-15 DIAGNOSIS — M19.041 PRIMARY OSTEOARTHRITIS, RIGHT HAND: ICD-10-CM

## 2024-02-15 DIAGNOSIS — Z96.651 PRESENCE OF RIGHT ARTIFICIAL KNEE JOINT: ICD-10-CM

## 2024-02-15 DIAGNOSIS — R29.2 ABNORMAL REFLEX: ICD-10-CM

## 2024-02-15 DIAGNOSIS — M18.0 BILATERAL PRIMARY OSTEOARTHRITIS OF FIRST CARPOMETACARPAL JOINTS: ICD-10-CM

## 2024-02-15 DIAGNOSIS — M89.9 DISORDER OF BONE, UNSPECIFIED: ICD-10-CM

## 2024-02-15 DIAGNOSIS — M94.9 DISORDER OF CARTILAGE, UNSPECIFIED: ICD-10-CM

## 2024-02-15 DIAGNOSIS — G89.29 OTHER CHRONIC PAIN: ICD-10-CM

## 2024-02-15 DIAGNOSIS — M19.042 PRIMARY OSTEOARTHRITIS, LEFT HAND: ICD-10-CM

## 2024-02-15 DIAGNOSIS — M19.072 PRIMARY OSTEOARTHRITIS, LEFT ANKLE AND FOOT: ICD-10-CM

## 2024-02-15 DIAGNOSIS — M19.032 PRIMARY OSTEOARTHRITIS, LEFT WRIST: ICD-10-CM

## 2024-02-15 DIAGNOSIS — M54.2 CERVICALGIA: Primary | ICD-10-CM

## 2024-02-15 LAB
25(OH)D3 SERPL-MCNC: 34 NG/ML (ref 30–100)
ALBUMIN SERPL BCP-MCNC: 4.3 G/DL (ref 3.4–5)
ALP SERPL-CCNC: 79 U/L (ref 33–136)
ALT SERPL W P-5'-P-CCNC: 17 U/L (ref 7–45)
ANION GAP SERPL CALC-SCNC: 9 MMOL/L (ref 10–20)
AST SERPL W P-5'-P-CCNC: 20 U/L (ref 9–39)
BASOPHILS # BLD AUTO: 0.03 X10*3/UL (ref 0–0.1)
BASOPHILS NFR BLD AUTO: 0.9 %
BILIRUB SERPL-MCNC: 0.4 MG/DL (ref 0–1.2)
BUN SERPL-MCNC: 8 MG/DL (ref 6–23)
CALCIUM SERPL-MCNC: 8.8 MG/DL (ref 8.6–10.3)
CHLORIDE SERPL-SCNC: 103 MMOL/L (ref 98–107)
CK SERPL-CCNC: 156 U/L (ref 0–215)
CO2 SERPL-SCNC: 29 MMOL/L (ref 21–32)
CREAT SERPL-MCNC: 0.69 MG/DL (ref 0.5–1.05)
EGFRCR SERPLBLD CKD-EPI 2021: >90 ML/MIN/1.73M*2
EOSINOPHIL # BLD AUTO: 0.31 X10*3/UL (ref 0–0.7)
EOSINOPHIL NFR BLD AUTO: 9.2 %
ERYTHROCYTE [DISTWIDTH] IN BLOOD BY AUTOMATED COUNT: 12.5 % (ref 11.5–14.5)
GLUCOSE SERPL-MCNC: 82 MG/DL (ref 74–99)
HCT VFR BLD AUTO: 40.7 % (ref 36–46)
HGB BLD-MCNC: 13.6 G/DL (ref 12–16)
IMM GRANULOCYTES # BLD AUTO: 0.01 X10*3/UL (ref 0–0.7)
IMM GRANULOCYTES NFR BLD AUTO: 0.3 % (ref 0–0.9)
LYMPHOCYTES # BLD AUTO: 1.23 X10*3/UL (ref 1.2–4.8)
LYMPHOCYTES NFR BLD AUTO: 36.4 %
MAGNESIUM SERPL-MCNC: 2.1 MG/DL (ref 1.6–2.4)
MCH RBC QN AUTO: 31.2 PG (ref 26–34)
MCHC RBC AUTO-ENTMCNC: 33.4 G/DL (ref 32–36)
MCV RBC AUTO: 93 FL (ref 80–100)
MONOCYTES # BLD AUTO: 0.24 X10*3/UL (ref 0.1–1)
MONOCYTES NFR BLD AUTO: 7.1 %
NEUTROPHILS # BLD AUTO: 1.56 X10*3/UL (ref 1.2–7.7)
NEUTROPHILS NFR BLD AUTO: 46.1 %
NRBC BLD-RTO: 0 /100 WBCS (ref 0–0)
PLATELET # BLD AUTO: 186 X10*3/UL (ref 150–450)
POTASSIUM SERPL-SCNC: 4 MMOL/L (ref 3.5–5.3)
PROT SERPL-MCNC: 6.7 G/DL (ref 6.4–8.2)
RBC # BLD AUTO: 4.36 X10*6/UL (ref 4–5.2)
SODIUM SERPL-SCNC: 137 MMOL/L (ref 136–145)
TSH SERPL-ACNC: 1.05 MIU/L (ref 0.44–3.98)
URATE SERPL-MCNC: 3.4 MG/DL (ref 2.3–6.7)
WBC # BLD AUTO: 3.4 X10*3/UL (ref 4.4–11.3)

## 2024-02-15 PROCEDURE — 84550 ASSAY OF BLOOD/URIC ACID: CPT

## 2024-02-15 PROCEDURE — 73120 X-RAY EXAM OF HAND: CPT | Mod: BILATERAL PROCEDURE | Performed by: RADIOLOGY

## 2024-02-15 PROCEDURE — 36415 COLL VENOUS BLD VENIPUNCTURE: CPT

## 2024-02-15 PROCEDURE — 82306 VITAMIN D 25 HYDROXY: CPT

## 2024-02-15 PROCEDURE — 86618 LYME DISEASE ANTIBODY: CPT

## 2024-02-15 PROCEDURE — 83516 IMMUNOASSAY NONANTIBODY: CPT

## 2024-02-15 PROCEDURE — 84300 ASSAY OF URINE SODIUM: CPT

## 2024-02-15 PROCEDURE — 80053 COMPREHEN METABOLIC PANEL: CPT

## 2024-02-15 PROCEDURE — 73110 X-RAY EXAM OF WRIST: CPT | Mod: 50

## 2024-02-15 PROCEDURE — 84207 ASSAY OF VITAMIN B-6: CPT

## 2024-02-15 PROCEDURE — 84425 ASSAY OF VITAMIN B-1: CPT

## 2024-02-15 PROCEDURE — 86334 IMMUNOFIX E-PHORESIS SERUM: CPT

## 2024-02-15 PROCEDURE — 72050 X-RAY EXAM NECK SPINE 4/5VWS: CPT | Performed by: RADIOLOGY

## 2024-02-15 PROCEDURE — 84165 PROTEIN E-PHORESIS SERUM: CPT

## 2024-02-15 PROCEDURE — 85025 COMPLETE CBC W/AUTO DIFF WBC: CPT

## 2024-02-15 PROCEDURE — 82550 ASSAY OF CK (CPK): CPT

## 2024-02-15 PROCEDURE — 73110 X-RAY EXAM OF WRIST: CPT | Mod: BILATERAL PROCEDURE | Performed by: RADIOLOGY

## 2024-02-15 PROCEDURE — 82652 VIT D 1 25-DIHYDROXY: CPT

## 2024-02-15 PROCEDURE — 72050 X-RAY EXAM NECK SPINE 4/5VWS: CPT

## 2024-02-15 PROCEDURE — 73120 X-RAY EXAM OF HAND: CPT | Mod: 50

## 2024-02-15 PROCEDURE — 84155 ASSAY OF PROTEIN SERUM: CPT

## 2024-02-15 PROCEDURE — 82570 ASSAY OF URINE CREATININE: CPT

## 2024-02-15 PROCEDURE — 73030 X-RAY EXAM OF SHOULDER: CPT | Mod: BILATERAL PROCEDURE | Performed by: RADIOLOGY

## 2024-02-15 PROCEDURE — 84165 PROTEIN E-PHORESIS SERUM: CPT | Performed by: INTERNAL MEDICINE

## 2024-02-15 PROCEDURE — 84443 ASSAY THYROID STIM HORMONE: CPT

## 2024-02-15 PROCEDURE — 86320 SERUM IMMUNOELECTROPHORESIS: CPT | Performed by: INTERNAL MEDICINE

## 2024-02-15 PROCEDURE — 73030 X-RAY EXAM OF SHOULDER: CPT | Mod: 50

## 2024-02-15 PROCEDURE — 83735 ASSAY OF MAGNESIUM: CPT

## 2024-02-16 LAB
CREAT UR-MCNC: 38.2 MG/DL (ref 20–320)
PROT SERPL-MCNC: 6.6 G/DL (ref 6.4–8.2)
SODIUM UR-SCNC: 57 MMOL/L
SODIUM/CREAT UR-RTO: 149 MMOL/G CREAT
TTG IGA SER IA-ACNC: <1 U/ML

## 2024-02-17 LAB — 1,25(OH)2D3 SERPL-MCNC: 70.2 PG/ML (ref 19.9–79.3)

## 2024-02-18 LAB — B BURGDOR.VLSE1+PEPC10 AB SER IA-ACNC: 0.27 IV

## 2024-02-19 LAB
PYRIDOXAL PHOS SERPL-SCNC: 83.6 NMOL/L (ref 20–125)
SCAN RESULT: NORMAL

## 2024-02-20 LAB — VIT B1 PYROPHOSHATE BLD-SCNC: 102 NMOL/L (ref 70–180)

## 2024-02-21 LAB
ALBUMIN: 4.3 G/DL (ref 3.4–5)
ALPHA 1 GLOBULIN: 0.3 G/DL (ref 0.2–0.6)
ALPHA 2 GLOBULIN: 0.6 G/DL (ref 0.4–1.1)
BETA GLOBULIN: 0.8 G/DL (ref 0.5–1.2)
GAMMA GLOBULIN: 0.7 G/DL (ref 0.5–1.4)
IMMUNOFIXATION COMMENT: NORMAL
PATH REVIEW - SERUM IMMUNOFIXATION: NORMAL
PATH REVIEW-SERUM PROTEIN ELECTROPHORESIS: NORMAL
PROTEIN ELECTROPHORESIS COMMENT: NORMAL

## 2024-02-29 DIAGNOSIS — J45.20 MILD INTERMITTENT ASTHMA WITHOUT COMPLICATION (HHS-HCC): Primary | ICD-10-CM

## 2024-02-29 RX ORDER — ALBUTEROL SULFATE 90 UG/1
2 AEROSOL, METERED RESPIRATORY (INHALATION) EVERY 6 HOURS PRN
COMMUNITY
Start: 2023-12-19 | End: 2024-02-29 | Stop reason: SDUPTHER

## 2024-02-29 RX ORDER — ALBUTEROL SULFATE 90 UG/1
2 AEROSOL, METERED RESPIRATORY (INHALATION) EVERY 6 HOURS PRN
Qty: 54 G | Refills: 3 | Status: SHIPPED | OUTPATIENT
Start: 2024-02-29 | End: 2025-02-28

## 2024-02-29 NOTE — TELEPHONE ENCOUNTER
ERROR NO NOTE, CHANGED MAIL DELIVERY PHARMACY TO [facility] FOR 90 DAY MED REFILL AND [facility] HERE IN [place] TO PICKUP MEDS IF NEED BE.

## 2024-02-29 NOTE — TELEPHONE ENCOUNTER
Pt asking for her Breo inhaler and her albuterol inhaler refilled. I had to pull the Albuterol from the reconcile side but did not see Breo. Please advise

## 2024-03-01 DIAGNOSIS — F41.9 ANXIETY: ICD-10-CM

## 2024-03-01 DIAGNOSIS — F51.01 PRIMARY INSOMNIA: ICD-10-CM

## 2024-03-01 DIAGNOSIS — I10 PRIMARY HYPERTENSION: ICD-10-CM

## 2024-03-01 DIAGNOSIS — J45.20 MILD INTERMITTENT ASTHMA WITHOUT COMPLICATION (HHS-HCC): Primary | ICD-10-CM

## 2024-03-01 DIAGNOSIS — K21.9 GASTROESOPHAGEAL REFLUX DISEASE WITHOUT ESOPHAGITIS: ICD-10-CM

## 2024-03-01 RX ORDER — FLUTICASONE FUROATE AND VILANTEROL 100; 25 UG/1; UG/1
1 POWDER RESPIRATORY (INHALATION) DAILY
Qty: 1 EACH | Refills: 11 | Status: SHIPPED | OUTPATIENT
Start: 2024-03-01

## 2024-03-01 RX ORDER — PREDNISONE 10 MG/1
TABLET ORAL
Qty: 30 TABLET | Refills: 0 | Status: SHIPPED | OUTPATIENT
Start: 2024-03-01 | End: 2024-03-12

## 2024-03-01 RX ORDER — TRAZODONE HYDROCHLORIDE 50 MG/1
50 TABLET ORAL NIGHTLY
Qty: 90 TABLET | Refills: 3 | Status: SHIPPED | OUTPATIENT
Start: 2024-03-01 | End: 2025-03-01

## 2024-03-01 RX ORDER — BUPROPION HYDROCHLORIDE 200 MG/1
200 TABLET, EXTENDED RELEASE ORAL
Qty: 90 TABLET | Refills: 3 | Status: SHIPPED | OUTPATIENT
Start: 2024-03-01 | End: 2025-03-01

## 2024-03-01 RX ORDER — LISINOPRIL 20 MG/1
20 TABLET ORAL DAILY
Qty: 90 TABLET | Refills: 3 | Status: SHIPPED | OUTPATIENT
Start: 2024-03-01 | End: 2025-03-01

## 2024-03-01 RX ORDER — PANTOPRAZOLE SODIUM 40 MG/1
40 TABLET, DELAYED RELEASE ORAL EVERY OTHER DAY
Qty: 45 TABLET | Refills: 3 | Status: SHIPPED | OUTPATIENT
Start: 2024-03-01 | End: 2024-05-17 | Stop reason: SDUPTHER

## 2024-05-06 ENCOUNTER — APPOINTMENT (OUTPATIENT)
Dept: PRIMARY CARE | Facility: CLINIC | Age: 59
End: 2024-05-06
Payer: COMMERCIAL

## 2024-05-17 ENCOUNTER — OFFICE VISIT (OUTPATIENT)
Dept: PRIMARY CARE | Facility: CLINIC | Age: 59
End: 2024-05-17
Payer: COMMERCIAL

## 2024-05-17 VITALS
BODY MASS INDEX: 26.81 KG/M2 | OXYGEN SATURATION: 98 % | SYSTOLIC BLOOD PRESSURE: 106 MMHG | HEART RATE: 75 BPM | HEIGHT: 68 IN | DIASTOLIC BLOOD PRESSURE: 62 MMHG | WEIGHT: 176.9 LBS

## 2024-05-17 DIAGNOSIS — I10 PRIMARY HYPERTENSION: ICD-10-CM

## 2024-05-17 DIAGNOSIS — Z12.11 COLON CANCER SCREENING: ICD-10-CM

## 2024-05-17 DIAGNOSIS — J45.20 MILD INTERMITTENT ASTHMA WITHOUT COMPLICATION (HHS-HCC): ICD-10-CM

## 2024-05-17 DIAGNOSIS — K91.2 POSTSURGICAL MALABSORPTION (HHS-HCC): ICD-10-CM

## 2024-05-17 DIAGNOSIS — K86.89 PANCREATIC MASS (HHS-HCC): ICD-10-CM

## 2024-05-17 DIAGNOSIS — Z12.31 BREAST CANCER SCREENING BY MAMMOGRAM: ICD-10-CM

## 2024-05-17 DIAGNOSIS — Z01.818 PREOP EXAMINATION: Primary | ICD-10-CM

## 2024-05-17 DIAGNOSIS — F41.9 ANXIETY: ICD-10-CM

## 2024-05-17 DIAGNOSIS — K21.9 GASTROESOPHAGEAL REFLUX DISEASE WITHOUT ESOPHAGITIS: ICD-10-CM

## 2024-05-17 PROCEDURE — 1036F TOBACCO NON-USER: CPT | Performed by: FAMILY MEDICINE

## 2024-05-17 PROCEDURE — 99214 OFFICE O/P EST MOD 30 MIN: CPT | Performed by: FAMILY MEDICINE

## 2024-05-17 PROCEDURE — 3078F DIAST BP <80 MM HG: CPT | Performed by: FAMILY MEDICINE

## 2024-05-17 PROCEDURE — 3074F SYST BP LT 130 MM HG: CPT | Performed by: FAMILY MEDICINE

## 2024-05-17 RX ORDER — MELOXICAM 7.5 MG/1
7.5 TABLET ORAL 2 TIMES DAILY PRN
COMMUNITY

## 2024-05-17 RX ORDER — PANTOPRAZOLE SODIUM 40 MG/1
40 TABLET, DELAYED RELEASE ORAL
Qty: 90 TABLET | Refills: 3 | Status: SHIPPED | OUTPATIENT
Start: 2024-05-17 | End: 2025-05-17

## 2024-05-17 ASSESSMENT — ENCOUNTER SYMPTOMS
SHORTNESS OF BREATH: 0
SLEEP DISTURBANCE: 0
NERVOUS/ANXIOUS: 0
JOINT SWELLING: 1
APPETITE CHANGE: 0
ABDOMINAL DISTENTION: 0
NAUSEA: 0
DIZZINESS: 0
ABDOMINAL PAIN: 0
TROUBLE SWALLOWING: 0
DIARRHEA: 0
CONSTIPATION: 0
NUMBNESS: 0
PALPITATIONS: 0
LIGHT-HEADEDNESS: 0
DYSPHORIC MOOD: 0
ADENOPATHY: 0
COUGH: 0
DIFFICULTY URINATING: 0
UNEXPECTED WEIGHT CHANGE: 0
RHINORRHEA: 0
VOMITING: 0
ARTHRALGIAS: 1
FATIGUE: 0
ACTIVITY CHANGE: 0
HEADACHES: 0
WHEEZING: 0

## 2024-05-17 NOTE — PROGRESS NOTES
"Subjective   Patient ID: Jennifer Perla is a 58 y.o. female who presents for Pre op Lt Carpal Tunnel.    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   HENT:  Negative for ear pain, nosebleeds, rhinorrhea, sneezing and trouble swallowing.    Respiratory:  Negative for cough, shortness of breath and wheezing.    Cardiovascular:  Negative for chest pain, palpitations and leg swelling.   Gastrointestinal:  Negative for abdominal distention, abdominal pain, constipation, diarrhea, nausea and vomiting.   Genitourinary:  Negative for difficulty urinating.   Musculoskeletal:  Positive for arthralgias and joint swelling.   Skin:  Negative for rash.   Neurological:  Negative for dizziness, light-headedness, numbness and headaches.   Hematological:  Negative for adenopathy.   Psychiatric/Behavioral:  Negative for behavioral problems, dysphoric mood and sleep disturbance. The patient is not nervous/anxious.    All other systems reviewed and are negative.      Objective   /62   Pulse 75   Ht 1.715 m (5' 7.5\")   Wt 80.2 kg (176 lb 14.4 oz)   SpO2 98%   BMI 27.30 kg/m²     Physical Exam  Vitals and nursing note reviewed.   Constitutional:       General: She is not in acute distress.     Appearance: Normal appearance. She is not toxic-appearing.   HENT:      Head: Normocephalic and atraumatic.      Right Ear: Tympanic membrane, ear canal and external ear normal.      Left Ear: Tympanic membrane, ear canal " and external ear normal.      Nose: Nose normal.      Mouth/Throat:      Mouth: Mucous membranes are moist.      Pharynx: Oropharynx is clear.   Eyes:      Extraocular Movements: Extraocular movements intact.      Conjunctiva/sclera: Conjunctivae normal.      Pupils: Pupils are equal, round, and reactive to light.   Cardiovascular:      Rate and Rhythm: Normal rate and regular rhythm.      Pulses: Normal pulses.      Heart sounds: Normal heart sounds.   Pulmonary:      Effort: Pulmonary effort is normal.      Breath sounds: Normal breath sounds.   Abdominal:      General: Abdomen is flat. Bowel sounds are normal.      Palpations: Abdomen is soft.   Musculoskeletal:      Cervical back: Normal range of motion and neck supple.   Skin:     General: Skin is warm and dry.      Capillary Refill: Capillary refill takes less than 2 seconds.   Neurological:      General: No focal deficit present.      Mental Status: She is alert and oriented to person, place, and time. Mental status is at baseline.   Psychiatric:         Mood and Affect: Mood normal.         Behavior: Behavior normal.         Assessment/Plan   Problem List Items Addressed This Visit             ICD-10-CM    Anxiety F41.9     Stable with medication, is no longer using alcohol, encouraged to attend sobriety counseling through her [employer].         Asthma ([facility]) J45.909     As she very stable now rare albuterol use, noticed a big difference since she no longer has a dog.         Relevant Orders    Follow Up In Primary Care - Established    GERD (gastroesophageal reflux disease) K21.9     Stable with pantoprazole.         Relevant Medications    pantoprazole (ProtoNix) 40 mg EC tablet    Other Relevant Orders    Follow Up In Primary Care - Established    HTN (hypertension) I10     Blood pressure stable, check labs, EKG today shows normal sinus rhythm, patient able to exercise 4 METS without difficulty, believe she is an acceptable risk for planned surgery.          Relevant Orders    Follow Up In Primary Care - Established    Comprehensive Metabolic Panel    Postsurgical malabsorption ([facility]) K91.2     Advised about diet and exercise, beginning to gain some weight, check blood testing.         Pancreatic mass ([facility]) K86.89     MRCP done last fall along with CA 19-9 all appear to be stable.          Other Visit Diagnoses         Codes    Preop examination    -  Primary Z01.818    Medically acceptable for planned orthopedic surgery.  Check labs, EKG shows normal sinus rhythm without ST-T changes.     Relevant Orders    Follow Up In Primary Care - Established    CBC and Auto Differential    Comprehensive Metabolic Panel    Protime-INR    APTT    Breast cancer screening by mammogram     Z12.31    Relevant Orders    Follow Up In Primary Care - Established    BI mammo bilateral screening tomosynthesis    Colon cancer screening     Z12.11    Relevant Orders    Follow Up In Primary Care - Established    Colonoscopy Screening; Average Risk Patient

## 2024-05-17 NOTE — ASSESSMENT & PLAN NOTE
Blood pressure stable, check labs, EKG today shows normal sinus rhythm, patient able to exercise 4 METS without difficulty, believe she is an acceptable risk for planned surgery.

## 2024-05-17 NOTE — ASSESSMENT & PLAN NOTE
Stable with medication, is no longer using alcohol, encouraged to attend sobriety counseling through her [employer].

## 2024-05-17 NOTE — ASSESSMENT & PLAN NOTE
As she very stable now rare albuterol use, noticed a big difference since she no longer has a dog.

## 2024-05-18 ENCOUNTER — LAB (OUTPATIENT)
Dept: LAB | Facility: LAB | Age: 59
End: 2024-05-18
Payer: COMMERCIAL

## 2024-05-18 DIAGNOSIS — I10 PRIMARY HYPERTENSION: ICD-10-CM

## 2024-05-18 DIAGNOSIS — Z01.818 PREOP EXAMINATION: ICD-10-CM

## 2024-05-18 LAB
ALBUMIN SERPL BCP-MCNC: 4 G/DL (ref 3.4–5)
ALP SERPL-CCNC: 70 U/L (ref 33–136)
ALT SERPL W P-5'-P-CCNC: 15 U/L (ref 7–45)
ANION GAP SERPL CALC-SCNC: 9 MMOL/L (ref 10–20)
APTT PPP: 29 SECONDS (ref 27–38)
AST SERPL W P-5'-P-CCNC: 18 U/L (ref 9–39)
BASOPHILS # BLD AUTO: 0.02 X10*3/UL (ref 0–0.1)
BASOPHILS NFR BLD AUTO: 0.7 %
BILIRUB SERPL-MCNC: 0.6 MG/DL (ref 0–1.2)
BUN SERPL-MCNC: 6 MG/DL (ref 6–23)
CALCIUM SERPL-MCNC: 8.6 MG/DL (ref 8.6–10.3)
CHLORIDE SERPL-SCNC: 102 MMOL/L (ref 98–107)
CO2 SERPL-SCNC: 28 MMOL/L (ref 21–32)
CREAT SERPL-MCNC: 0.69 MG/DL (ref 0.5–1.05)
EGFRCR SERPLBLD CKD-EPI 2021: >90 ML/MIN/1.73M*2
EOSINOPHIL # BLD AUTO: 0.14 X10*3/UL (ref 0–0.7)
EOSINOPHIL NFR BLD AUTO: 5.2 %
ERYTHROCYTE [DISTWIDTH] IN BLOOD BY AUTOMATED COUNT: 12.8 % (ref 11.5–14.5)
GLUCOSE SERPL-MCNC: 93 MG/DL (ref 74–99)
HCT VFR BLD AUTO: 38.8 % (ref 36–46)
HGB BLD-MCNC: 12.8 G/DL (ref 12–16)
IMM GRANULOCYTES # BLD AUTO: 0 X10*3/UL (ref 0–0.7)
IMM GRANULOCYTES NFR BLD AUTO: 0 % (ref 0–0.9)
INR PPP: 0.9 (ref 0.9–1.1)
LYMPHOCYTES # BLD AUTO: 0.97 X10*3/UL (ref 1.2–4.8)
LYMPHOCYTES NFR BLD AUTO: 35.9 %
MCH RBC QN AUTO: 30.8 PG (ref 26–34)
MCHC RBC AUTO-ENTMCNC: 33 G/DL (ref 32–36)
MCV RBC AUTO: 94 FL (ref 80–100)
MONOCYTES # BLD AUTO: 0.24 X10*3/UL (ref 0.1–1)
MONOCYTES NFR BLD AUTO: 8.9 %
NEUTROPHILS # BLD AUTO: 1.33 X10*3/UL (ref 1.2–7.7)
NEUTROPHILS NFR BLD AUTO: 49.3 %
NRBC BLD-RTO: 0 /100 WBCS (ref 0–0)
PLATELET # BLD AUTO: 180 X10*3/UL (ref 150–450)
POTASSIUM SERPL-SCNC: 4.5 MMOL/L (ref 3.5–5.3)
PROT SERPL-MCNC: 5.8 G/DL (ref 6.4–8.2)
PROTHROMBIN TIME: 10.5 SECONDS (ref 9.8–12.8)
RBC # BLD AUTO: 4.15 X10*6/UL (ref 4–5.2)
SODIUM SERPL-SCNC: 134 MMOL/L (ref 136–145)
WBC # BLD AUTO: 2.7 X10*3/UL (ref 4.4–11.3)

## 2024-05-18 PROCEDURE — 80053 COMPREHEN METABOLIC PANEL: CPT

## 2024-05-18 PROCEDURE — 85730 THROMBOPLASTIN TIME PARTIAL: CPT

## 2024-05-18 PROCEDURE — 36415 COLL VENOUS BLD VENIPUNCTURE: CPT

## 2024-05-18 PROCEDURE — 85025 COMPLETE CBC W/AUTO DIFF WBC: CPT

## 2024-05-18 PROCEDURE — 85610 PROTHROMBIN TIME: CPT

## 2024-05-20 ENCOUNTER — TELEPHONE (OUTPATIENT)
Dept: PRIMARY CARE | Facility: CLINIC | Age: 59
End: 2024-05-20
Payer: COMMERCIAL

## 2024-05-21 NOTE — TELEPHONE ENCOUNTER
----- Message from Devaughn Black MD sent at 5/20/2024  7:52 AM EDT -----  Please forward copy of laboratory testing along with the EKG to the patient's surgeon.

## 2024-05-23 ENCOUNTER — HOSPITAL ENCOUNTER (OUTPATIENT)
Dept: RADIOLOGY | Facility: CLINIC | Age: 59
Discharge: HOME | End: 2024-05-23
Payer: COMMERCIAL

## 2024-05-23 VITALS — WEIGHT: 172 LBS | BODY MASS INDEX: 26.07 KG/M2 | HEIGHT: 68 IN

## 2024-05-23 DIAGNOSIS — Z12.31 BREAST CANCER SCREENING BY MAMMOGRAM: ICD-10-CM

## 2024-05-23 PROCEDURE — 77063 BREAST TOMOSYNTHESIS BI: CPT | Performed by: RADIOLOGY

## 2024-05-23 PROCEDURE — 77063 BREAST TOMOSYNTHESIS BI: CPT

## 2024-05-23 PROCEDURE — 77067 SCR MAMMO BI INCL CAD: CPT | Performed by: RADIOLOGY

## 2024-05-23 PROCEDURE — 77067 SCR MAMMO BI INCL CAD: CPT

## 2024-09-06 ENCOUNTER — CLINICAL SUPPORT (OUTPATIENT)
Age: 59
End: 2024-09-06
Payer: COMMERCIAL

## 2024-09-06 ENCOUNTER — TELEPHONE (OUTPATIENT)
Age: 59
End: 2024-09-06

## 2024-09-06 DIAGNOSIS — N30.01 ACUTE CYSTITIS WITH HEMATURIA: Primary | ICD-10-CM

## 2024-09-06 DIAGNOSIS — R82.90 ABNORMAL URINALYSIS: ICD-10-CM

## 2024-09-06 DIAGNOSIS — R35.0 URINARY FREQUENCY: ICD-10-CM

## 2024-09-06 LAB
POC APPEARANCE, URINE: CLEAR
POC BILIRUBIN, URINE: ABNORMAL
POC BLOOD, URINE: ABNORMAL
POC COLOR, URINE: YELLOW
POC GLUCOSE, URINE: NEGATIVE MG/DL
POC KETONES, URINE: ABNORMAL MG/DL
POC LEUKOCYTES, URINE: ABNORMAL
POC NITRITE,URINE: NEGATIVE
POC PH, URINE: 5.5 PH
POC PROTEIN, URINE: NEGATIVE MG/DL
POC SPECIFIC GRAVITY, URINE: 1.02
POC UROBILINOGEN, URINE: 0.2 EU/DL

## 2024-09-06 PROCEDURE — 81003 URINALYSIS AUTO W/O SCOPE: CPT | Performed by: FAMILY MEDICINE

## 2024-09-06 RX ORDER — NITROFURANTOIN 25; 75 MG/1; MG/1
100 CAPSULE ORAL 2 TIMES DAILY
Qty: 14 CAPSULE | Refills: 0 | Status: SHIPPED | OUTPATIENT
Start: 2024-09-06 | End: 2024-09-13

## 2024-09-06 NOTE — PROGRESS NOTES
Patient brought a urine sample into the office today for urinary frequency . ran urine test strip for POCT UA. Results forwarded to PCP.

## 2024-09-06 NOTE — RESULT ENCOUNTER NOTE
Let the patient know that her urinalysis does suggest that she may have a urinary tract infection, I sent a prescription for nitrofurantoin to local [facility] pharmacy.

## 2024-09-06 NOTE — TELEPHONE ENCOUNTER
----- Message from Devaughn Black sent at 9/6/2024  3:22 PM EDT -----  Let the patient know that her urinalysis does suggest that she may have a urinary tract infection, I sent a prescription for nitrofurantoin to local [facility] pharmacy.

## 2024-09-23 ENCOUNTER — TELEPHONE (OUTPATIENT)
Age: 59
End: 2024-09-23
Payer: COMMERCIAL

## 2024-09-23 DIAGNOSIS — N30.01 ACUTE CYSTITIS WITH HEMATURIA: Primary | ICD-10-CM

## 2024-09-23 RX ORDER — CIPROFLOXACIN 500 MG/1
500 TABLET ORAL 2 TIMES DAILY
Qty: 10 TABLET | Refills: 0 | Status: SHIPPED | OUTPATIENT
Start: 2024-09-23 | End: 2024-09-28

## 2024-09-23 NOTE — TELEPHONE ENCOUNTER
Pt states she is having UTI Sx and is out of town. Pt wondering if you could send something in for her. Pt states she could be seen Thursday if there is any opening to follow up with you.. Please advise

## 2024-09-23 NOTE — TELEPHONE ENCOUNTER
I be happy to send in something for her for an acute urinary tract infection, but I need to know what pharmacy to send it to and where.

## 2024-11-13 DIAGNOSIS — M25.50 ARTHRALGIA, UNSPECIFIED JOINT: ICD-10-CM

## 2024-11-13 DIAGNOSIS — K91.2 POSTSURGICAL MALABSORPTION (HHS-HCC): ICD-10-CM

## 2024-11-13 DIAGNOSIS — I10 PRIMARY HYPERTENSION: Primary | ICD-10-CM

## 2024-11-13 PROBLEM — K86.89 PANCREATIC MASS (HHS-HCC): Status: RESOLVED | Noted: 2023-10-12 | Resolved: 2024-11-13

## 2024-11-19 ENCOUNTER — LAB (OUTPATIENT)
Dept: LAB | Facility: LAB | Age: 59
End: 2024-11-19
Payer: COMMERCIAL

## 2024-11-19 DIAGNOSIS — I10 PRIMARY HYPERTENSION: ICD-10-CM

## 2024-11-19 DIAGNOSIS — M25.50 ARTHRALGIA, UNSPECIFIED JOINT: ICD-10-CM

## 2024-11-19 DIAGNOSIS — K91.2 POSTSURGICAL MALABSORPTION (HHS-HCC): ICD-10-CM

## 2024-11-19 LAB
ALBUMIN SERPL BCP-MCNC: 4.2 G/DL (ref 3.4–5)
ALP SERPL-CCNC: 79 U/L (ref 33–136)
ALT SERPL W P-5'-P-CCNC: 17 U/L (ref 7–45)
ANION GAP SERPL CALC-SCNC: 10 MMOL/L (ref 10–20)
AST SERPL W P-5'-P-CCNC: 19 U/L (ref 9–39)
BASOPHILS # BLD AUTO: 0.02 X10*3/UL (ref 0–0.1)
BASOPHILS NFR BLD AUTO: 0.6 %
BILIRUB SERPL-MCNC: 0.5 MG/DL (ref 0–1.2)
BUN SERPL-MCNC: 7 MG/DL (ref 6–23)
CALCIUM SERPL-MCNC: 9.3 MG/DL (ref 8.6–10.3)
CHLORIDE SERPL-SCNC: 105 MMOL/L (ref 98–107)
CO2 SERPL-SCNC: 28 MMOL/L (ref 21–32)
CREAT SERPL-MCNC: 0.7 MG/DL (ref 0.5–1.05)
EGFRCR SERPLBLD CKD-EPI 2021: >90 ML/MIN/1.73M*2
EOSINOPHIL # BLD AUTO: 0.09 X10*3/UL (ref 0–0.7)
EOSINOPHIL NFR BLD AUTO: 2.6 %
ERYTHROCYTE [DISTWIDTH] IN BLOOD BY AUTOMATED COUNT: 12.5 % (ref 11.5–14.5)
FERRITIN SERPL-MCNC: 21 NG/ML (ref 8–150)
GLUCOSE SERPL-MCNC: 90 MG/DL (ref 74–99)
HCT VFR BLD AUTO: 38.9 % (ref 36–46)
HGB BLD-MCNC: 13 G/DL (ref 12–16)
IMM GRANULOCYTES # BLD AUTO: 0.01 X10*3/UL (ref 0–0.7)
IMM GRANULOCYTES NFR BLD AUTO: 0.3 % (ref 0–0.9)
LYMPHOCYTES # BLD AUTO: 1.11 X10*3/UL (ref 1.2–4.8)
LYMPHOCYTES NFR BLD AUTO: 31.5 %
MCH RBC QN AUTO: 31.5 PG (ref 26–34)
MCHC RBC AUTO-ENTMCNC: 33.4 G/DL (ref 32–36)
MCV RBC AUTO: 94 FL (ref 80–100)
MONOCYTES # BLD AUTO: 0.24 X10*3/UL (ref 0.1–1)
MONOCYTES NFR BLD AUTO: 6.8 %
NEUTROPHILS # BLD AUTO: 2.05 X10*3/UL (ref 1.2–7.7)
NEUTROPHILS NFR BLD AUTO: 58.2 %
NRBC BLD-RTO: 0 /100 WBCS (ref 0–0)
PLATELET # BLD AUTO: 197 X10*3/UL (ref 150–450)
POTASSIUM SERPL-SCNC: 4.6 MMOL/L (ref 3.5–5.3)
PROT SERPL-MCNC: 6.2 G/DL (ref 6.4–8.2)
RBC # BLD AUTO: 4.13 X10*6/UL (ref 4–5.2)
SODIUM SERPL-SCNC: 138 MMOL/L (ref 136–145)
VIT B12 SERPL-MCNC: 1119 PG/ML (ref 211–911)
WBC # BLD AUTO: 3.5 X10*3/UL (ref 4.4–11.3)

## 2024-11-19 PROCEDURE — 36415 COLL VENOUS BLD VENIPUNCTURE: CPT

## 2024-11-19 PROCEDURE — 82728 ASSAY OF FERRITIN: CPT

## 2024-11-19 PROCEDURE — 80053 COMPREHEN METABOLIC PANEL: CPT

## 2024-11-19 PROCEDURE — 82607 VITAMIN B-12: CPT

## 2024-11-19 PROCEDURE — 85025 COMPLETE CBC W/AUTO DIFF WBC: CPT

## 2024-11-21 ENCOUNTER — APPOINTMENT (OUTPATIENT)
Dept: PRIMARY CARE | Facility: CLINIC | Age: 59
End: 2024-11-21
Payer: COMMERCIAL

## 2024-11-21 VITALS
BODY MASS INDEX: 26.04 KG/M2 | SYSTOLIC BLOOD PRESSURE: 114 MMHG | OXYGEN SATURATION: 94 % | HEIGHT: 68 IN | HEART RATE: 67 BPM | DIASTOLIC BLOOD PRESSURE: 80 MMHG | WEIGHT: 171.8 LBS

## 2024-11-21 DIAGNOSIS — J30.2 SEASONAL ALLERGIES: ICD-10-CM

## 2024-11-21 DIAGNOSIS — M25.50 ARTHRALGIA, UNSPECIFIED JOINT: ICD-10-CM

## 2024-11-21 DIAGNOSIS — I10 PRIMARY HYPERTENSION: Primary | ICD-10-CM

## 2024-11-21 DIAGNOSIS — F41.9 ANXIETY: ICD-10-CM

## 2024-11-21 DIAGNOSIS — F51.01 PRIMARY INSOMNIA: ICD-10-CM

## 2024-11-21 DIAGNOSIS — K21.9 GASTROESOPHAGEAL REFLUX DISEASE WITHOUT ESOPHAGITIS: ICD-10-CM

## 2024-11-21 DIAGNOSIS — Z12.11 COLON CANCER SCREENING: ICD-10-CM

## 2024-11-21 DIAGNOSIS — J45.20 MILD INTERMITTENT ASTHMA WITHOUT COMPLICATION (HHS-HCC): ICD-10-CM

## 2024-11-21 DIAGNOSIS — K91.2 POSTSURGICAL MALABSORPTION (HHS-HCC): ICD-10-CM

## 2024-11-21 PROBLEM — R10.9 FLANK PAIN: Status: RESOLVED | Noted: 2023-09-21 | Resolved: 2024-11-21

## 2024-11-21 PROCEDURE — 99214 OFFICE O/P EST MOD 30 MIN: CPT | Performed by: FAMILY MEDICINE

## 2024-11-21 PROCEDURE — 3079F DIAST BP 80-89 MM HG: CPT | Performed by: FAMILY MEDICINE

## 2024-11-21 PROCEDURE — 3074F SYST BP LT 130 MM HG: CPT | Performed by: FAMILY MEDICINE

## 2024-11-21 PROCEDURE — 1036F TOBACCO NON-USER: CPT | Performed by: FAMILY MEDICINE

## 2024-11-21 PROCEDURE — 3008F BODY MASS INDEX DOCD: CPT | Performed by: FAMILY MEDICINE

## 2024-11-21 RX ORDER — TRAZODONE HYDROCHLORIDE 50 MG/1
50 TABLET ORAL NIGHTLY
Qty: 90 TABLET | Refills: 3 | Status: SHIPPED | OUTPATIENT
Start: 2024-11-21 | End: 2025-11-21

## 2024-11-21 RX ORDER — BUPROPION HYDROCHLORIDE 200 MG/1
200 TABLET, EXTENDED RELEASE ORAL
Qty: 90 TABLET | Refills: 3 | Status: SHIPPED | OUTPATIENT
Start: 2024-11-21 | End: 2025-11-21

## 2024-11-21 RX ORDER — ROFLUMILAST 3 MG/G
CREAM TOPICAL
COMMUNITY
Start: 2024-09-30

## 2024-11-21 RX ORDER — ALBUTEROL SULFATE 90 UG/1
2 INHALANT RESPIRATORY (INHALATION) EVERY 6 HOURS PRN
Qty: 54 G | Refills: 3 | Status: SHIPPED | OUTPATIENT
Start: 2024-11-21 | End: 2025-11-21

## 2024-11-21 RX ORDER — MELOXICAM 15 MG/1
15 TABLET ORAL DAILY
Qty: 90 TABLET | Refills: 3 | Status: SHIPPED | OUTPATIENT
Start: 2024-11-21 | End: 2025-11-21

## 2024-11-21 RX ORDER — LISINOPRIL 20 MG/1
20 TABLET ORAL DAILY
Qty: 90 TABLET | Refills: 3 | Status: SHIPPED | OUTPATIENT
Start: 2024-11-21 | End: 2025-11-21

## 2024-11-21 ASSESSMENT — ENCOUNTER SYMPTOMS
LIGHT-HEADEDNESS: 0
RHINORRHEA: 0
DIFFICULTY URINATING: 0
HEADACHES: 0
ADENOPATHY: 0
SHORTNESS OF BREATH: 0
PALPITATIONS: 0
DIARRHEA: 0
ACTIVITY CHANGE: 0
NUMBNESS: 0
VOMITING: 0
WHEEZING: 0
DYSPHORIC MOOD: 0
NAUSEA: 0
SLEEP DISTURBANCE: 0
UNEXPECTED WEIGHT CHANGE: 0
ABDOMINAL PAIN: 0
ABDOMINAL DISTENTION: 0
APPETITE CHANGE: 0
ARTHRALGIAS: 1
FATIGUE: 0
TROUBLE SWALLOWING: 0
CONSTIPATION: 0
COUGH: 0
NERVOUS/ANXIOUS: 0
DIZZINESS: 0

## 2024-11-21 NOTE — PROGRESS NOTES
"Subjective   Patient ID: Jennifer Perla is a 59 y.o. female who presents for 6 Mo LABS.    HPI   No headache, chest pain, shortness of breath, dizziness, lightheadedness, or edema  Had left [facility] in May  Taking PPI daily without breakthrough symptoms.  Reviewed dietary, caffeine, tobacco, alcohol, and NSAID use.  No dyspepsia, dysphagia, reflux, melena, or abdominal pain.  Not needing to use MDI, uses albuterol sometimes in fall  Allergies doing OK, occ Allegra use  No GERD, some bloating and loose stools at times  + joint pain, noticed psoriasis, had seen rheumatology in the past  No ETOH for 4 months  No colonoscopy, no family history of colon cancer  Having pain in foot/swelling    Review of Systems   Constitutional:  Negative for activity change, appetite change, fatigue and unexpected weight change.   HENT:  Negative for ear pain, nosebleeds, rhinorrhea, sneezing and trouble swallowing.    Respiratory:  Negative for cough, shortness of breath and wheezing.    Cardiovascular:  Negative for chest pain, palpitations and leg swelling.   Gastrointestinal:  Negative for abdominal distention, abdominal pain, constipation, diarrhea, nausea and vomiting.   Genitourinary:  Negative for difficulty urinating.   Musculoskeletal:  Positive for arthralgias.   Skin:  Positive for rash.   Neurological:  Negative for dizziness, light-headedness, numbness and headaches.   Hematological:  Negative for adenopathy.   Psychiatric/Behavioral:  Negative for behavioral problems, dysphoric mood and sleep disturbance. The patient is not nervous/anxious.    All other systems reviewed and are negative.      Objective   /80   Pulse 67   Ht 1.727 m (5' 8\")   Wt 77.9 kg (171 lb 12.8 oz)   SpO2 94%   BMI 26.12 kg/m²     Physical Exam  Vitals and nursing note reviewed.   Constitutional:       General: She is not in acute distress.     Appearance: Normal appearance. She is not toxic-appearing.   HENT:      Head: Normocephalic and " atraumatic.      Right Ear: Tympanic membrane, ear canal and external ear normal.      Left Ear: Tympanic membrane, ear canal and external ear normal.      Nose: Nose normal.      Mouth/Throat:      Mouth: Mucous membranes are moist.      Pharynx: Oropharynx is clear.   Eyes:      Extraocular Movements: Extraocular movements intact.      Conjunctiva/sclera: Conjunctivae normal.      Pupils: Pupils are equal, round, and reactive to light.   Cardiovascular:      Rate and Rhythm: Normal rate and regular rhythm.      Pulses: Normal pulses.      Heart sounds: Normal heart sounds.   Pulmonary:      Effort: Pulmonary effort is normal.      Breath sounds: Normal breath sounds.   Abdominal:      General: Abdomen is flat. Bowel sounds are normal.      Palpations: Abdomen is soft.   Musculoskeletal:      Cervical back: Normal range of motion and neck supple.   Skin:     General: Skin is warm and dry.      Capillary Refill: Capillary refill takes less than 2 seconds.   Neurological:      General: No focal deficit present.      Mental Status: She is alert and oriented to person, place, and time. Mental status is at baseline.   Psychiatric:         Mood and Affect: Mood normal.         Behavior: Behavior normal.         Assessment/Plan   Problem List Items Addressed This Visit             ICD-10-CM    Anxiety F41.9     Controlled with medicine.         Relevant Medications    buPROPion SR (Wellbutrin SR) 200 mg 12 hr tablet    Other Relevant Orders    Follow Up In Primary Care - Established    Asthma J45.909     Better since dog , rarely has to use albuterol.         Relevant Medications    albuterol 90 mcg/actuation inhaler    Other Relevant Orders    Follow Up In Primary Care - Established    GERD (gastroesophageal reflux disease) K21.9     Stable with PPI         Relevant Orders    Follow Up In Primary Care - Established    HTN (hypertension) - Primary I10     Blood pressure stable, renal function stable, no change with  medication.         Relevant Medications    lisinopril 20 mg tablet    Other Relevant Orders    Follow Up In Primary Care - Established    Comprehensive Metabolic Panel    Insomnia G47.00    Relevant Medications    traZODone (Desyrel) 50 mg tablet    Other Relevant Orders    Follow Up In Primary Care - Established    Seasonal allergies J30.2     Stable with medication.         Relevant Orders    Follow Up In Primary Care - Established    Postsurgical malabsorption ([facility]) K91.2     Laboratory testing stable.         Relevant Orders    Follow Up In Primary Care - Established    CBC and Auto Differential    Comprehensive Metabolic Panel    Vitamin B12    Ferritin    Arthralgia M25.50     Patient was diagnosed with psoriasis by dermatology, could be psoriatic arthritis, encouraged to follow-up with dermatologist.         Relevant Medications    meloxicam (Mobic) 15 mg tablet    Other Relevant Orders    Follow Up In Primary Care - Established     Other Visit Diagnoses         Codes    Colon cancer screening     Z12.11    Relevant Orders    Follow Up In Primary Care - Established    Colonoscopy Screening; Average Risk Patient

## 2024-11-21 NOTE — ASSESSMENT & PLAN NOTE
Patient was diagnosed with psoriasis by dermatology, could be psoriatic arthritis, encouraged to follow-up with dermatologist.

## 2024-12-05 ENCOUNTER — TELEPHONE (OUTPATIENT)
Age: 59
End: 2024-12-05
Payer: COMMERCIAL

## 2024-12-05 NOTE — TELEPHONE ENCOUNTER
Spoke to patient about colonoscopy referral, patient going to call Dr. Garcia's office sometime today and schedule.

## 2024-12-13 ENCOUNTER — OFFICE VISIT (OUTPATIENT)
Age: 59
End: 2024-12-13
Payer: COMMERCIAL

## 2024-12-13 VITALS
BODY MASS INDEX: 26.07 KG/M2 | WEIGHT: 172 LBS | HEART RATE: 74 BPM | SYSTOLIC BLOOD PRESSURE: 118 MMHG | OXYGEN SATURATION: 97 % | HEIGHT: 68 IN | DIASTOLIC BLOOD PRESSURE: 90 MMHG

## 2024-12-13 DIAGNOSIS — J45.21 MILD INTERMITTENT ASTHMA WITH ACUTE EXACERBATION (HHS-HCC): Primary | ICD-10-CM

## 2024-12-13 PROCEDURE — 1036F TOBACCO NON-USER: CPT | Performed by: FAMILY MEDICINE

## 2024-12-13 PROCEDURE — 3074F SYST BP LT 130 MM HG: CPT | Performed by: FAMILY MEDICINE

## 2024-12-13 PROCEDURE — 3080F DIAST BP >= 90 MM HG: CPT | Performed by: FAMILY MEDICINE

## 2024-12-13 PROCEDURE — 3008F BODY MASS INDEX DOCD: CPT | Performed by: FAMILY MEDICINE

## 2024-12-13 PROCEDURE — 99213 OFFICE O/P EST LOW 20 MIN: CPT | Performed by: FAMILY MEDICINE

## 2024-12-13 RX ORDER — AZITHROMYCIN 250 MG/1
TABLET, FILM COATED ORAL
Qty: 6 TABLET | Refills: 0 | Status: SHIPPED | OUTPATIENT
Start: 2024-12-13 | End: 2024-12-17

## 2024-12-13 RX ORDER — PREDNISONE 10 MG/1
TABLET ORAL
Qty: 30 TABLET | Refills: 0 | Status: SHIPPED | OUTPATIENT
Start: 2024-12-13 | End: 2024-12-24

## 2024-12-13 ASSESSMENT — ENCOUNTER SYMPTOMS
CHEST TIGHTNESS: 0
ACTIVITY CHANGE: 0
WHEEZING: 1
FEVER: 0
CONSTIPATION: 0
NAUSEA: 0
ABDOMINAL PAIN: 0
SHORTNESS OF BREATH: 1
PALPITATIONS: 0
SORE THROAT: 0
SINUS PRESSURE: 0
VOMITING: 0
HEADACHES: 0
SINUS PAIN: 0
DIARRHEA: 0
APPETITE CHANGE: 0
COUGH: 1
CHILLS: 1
RHINORRHEA: 0
FATIGUE: 1

## 2024-12-13 NOTE — PROGRESS NOTES
"Subjective   Patient ID: Jennifer Perla is a 59 y.o. female who presents for Cough (x3wks).    HPI   3 weeks with cough, started with ST, some sputum, some SOB/wheezing, using albuterol (not helpful), using cough medicine, some chills, no nausea, chest tight, hard to sleep at night, sleeping sitting up    Review of Systems   Constitutional:  Positive for chills and fatigue. Negative for activity change, appetite change and fever.   HENT:  Negative for congestion, nosebleeds, postnasal drip, rhinorrhea, sinus pressure, sinus pain, sneezing and sore throat.    Respiratory:  Positive for cough, shortness of breath and wheezing. Negative for chest tightness.    Cardiovascular:  Negative for chest pain, palpitations and leg swelling.   Gastrointestinal:  Negative for abdominal pain, constipation, diarrhea, nausea and vomiting.   Neurological:  Negative for headaches.       Objective   /90   Pulse 74   Ht 1.727 m (5' 8\")   Wt 78 kg (172 lb)   SpO2 97%   BMI 26.15 kg/m²     Physical Exam  Vitals and nursing note reviewed.   Constitutional:       Appearance: Normal appearance.   HENT:      Head: Normocephalic and atraumatic.      Right Ear: Tympanic membrane, ear canal and external ear normal.      Left Ear: Tympanic membrane, ear canal and external ear normal.      Nose: Nose normal.      Mouth/Throat:      Mouth: Mucous membranes are moist.      Pharynx: Oropharynx is clear.   Cardiovascular:      Rate and Rhythm: Normal rate and regular rhythm.      Pulses: Normal pulses.      Heart sounds: Normal heart sounds.   Pulmonary:      Effort: Pulmonary effort is normal.      Breath sounds: Wheezing present.   Musculoskeletal:      Cervical back: Normal range of motion and neck supple.   Neurological:      Mental Status: She is alert.   Psychiatric:         Mood and Affect: Mood normal.         Behavior: Behavior normal.         Assessment/Plan   Problem List Items Addressed This Visit             ICD-10-CM    Asthma " - Primary J45.909     Asthma attack probably triggered by a virus around Thanksgiving time, placed on prednisone taper, azithromycin, use albuterol inhaler 3-4 times a day for the next week, call if not making improvement in the next 4 to 5 days.         Relevant Medications    predniSONE (Deltasone) 10 mg tablet    azithromycin (Zithromax) 250 mg tablet

## 2024-12-13 NOTE — ASSESSMENT & PLAN NOTE
Asthma attack probably triggered by a virus around Thanksgiving time, placed on prednisone taper, azithromycin, use albuterol inhaler 3-4 times a day for the next week, call if not making improvement in the next 4 to 5 days.

## 2024-12-27 ENCOUNTER — HOSPITAL ENCOUNTER (OUTPATIENT)
Dept: RADIOLOGY | Facility: HOSPITAL | Age: 59
Discharge: HOME | End: 2024-12-27
Payer: COMMERCIAL

## 2024-12-27 ENCOUNTER — OFFICE VISIT (OUTPATIENT)
Age: 59
End: 2024-12-27
Payer: COMMERCIAL

## 2024-12-27 VITALS
BODY MASS INDEX: 26.34 KG/M2 | DIASTOLIC BLOOD PRESSURE: 82 MMHG | SYSTOLIC BLOOD PRESSURE: 122 MMHG | WEIGHT: 173.8 LBS | HEIGHT: 68 IN | OXYGEN SATURATION: 99 % | HEART RATE: 79 BPM

## 2024-12-27 DIAGNOSIS — R05.2 SUBACUTE COUGH: Primary | ICD-10-CM

## 2024-12-27 DIAGNOSIS — J45.40 MODERATE PERSISTENT ASTHMA, UNSPECIFIED WHETHER COMPLICATED (HHS-HCC): ICD-10-CM

## 2024-12-27 DIAGNOSIS — R05.2 SUBACUTE COUGH: ICD-10-CM

## 2024-12-27 PROCEDURE — 99214 OFFICE O/P EST MOD 30 MIN: CPT | Performed by: STUDENT IN AN ORGANIZED HEALTH CARE EDUCATION/TRAINING PROGRAM

## 2024-12-27 PROCEDURE — 1036F TOBACCO NON-USER: CPT | Performed by: STUDENT IN AN ORGANIZED HEALTH CARE EDUCATION/TRAINING PROGRAM

## 2024-12-27 PROCEDURE — 3079F DIAST BP 80-89 MM HG: CPT | Performed by: STUDENT IN AN ORGANIZED HEALTH CARE EDUCATION/TRAINING PROGRAM

## 2024-12-27 PROCEDURE — 3008F BODY MASS INDEX DOCD: CPT | Performed by: STUDENT IN AN ORGANIZED HEALTH CARE EDUCATION/TRAINING PROGRAM

## 2024-12-27 PROCEDURE — 71046 X-RAY EXAM CHEST 2 VIEWS: CPT

## 2024-12-27 PROCEDURE — 3074F SYST BP LT 130 MM HG: CPT | Performed by: STUDENT IN AN ORGANIZED HEALTH CARE EDUCATION/TRAINING PROGRAM

## 2024-12-27 RX ORDER — FLUTICASONE PROPIONATE 110 UG/1
1 AEROSOL, METERED RESPIRATORY (INHALATION)
Qty: 12 G | Refills: 5 | Status: SHIPPED | OUTPATIENT
Start: 2024-12-27 | End: 2025-12-27

## 2024-12-27 ASSESSMENT — PATIENT HEALTH QUESTIONNAIRE - PHQ9
2. FEELING DOWN, DEPRESSED OR HOPELESS: NOT AT ALL
1. LITTLE INTEREST OR PLEASURE IN DOING THINGS: NOT AT ALL
SUM OF ALL RESPONSES TO PHQ9 QUESTIONS 1 & 2: 0

## 2024-12-27 NOTE — PROGRESS NOTES
Subjective:  Jennifer Perla is a 59 y.o. female who presents to clinic today for Cough (Cough resolved. Pain in mid-upper back)      Patient came in to see Dr. Black 12/13/2024.  She was seen at that time due to cough x 3 weeks she is having some shortness of breath having to use her albuterol which was not helpful and was taking over-the-counter medications.  At that time she was diagnosed with probable asthma attack secondary to viral URI put on prednisone azithromycin and albuterol as needed.  She has not improved so comes back to the office today.  She notes that Dr. Black recommended chest x-ray if not improving.    She notes that symptoms started the day before Thanksgiving and she has had the worst coughing of her life. Her cough did subside while on prednisone. She notes that she is no longer caughing but she has pain in the back of her lungs/back.   She notes her asthma hasn't been good, noticed at a conference with yelling. No fevers, chills, congestion. Occasional coughing.     Review of Systems    Assessment/Plan:  Jennifer Perla is a 59 y.o. female with a history of asthma, anxiety, GERD, hypertension, insomnia who presents to clinic today to address the following issues:   1. Subacute cough  XR chest 2 views    fluticasone (Flovent) 110 mcg/actuation inhaler      2. Moderate persistent asthma, unspecified whether complicated ([facility])  fluticasone (Flovent) 110 mcg/actuation inhaler        Asthma:  Chronic problem, new to provider  Requires further management and treatment  Discussed with Jennifer that with her symptoms as well as 6 weeks of coughing I would recommend controller inhaler for at least 1 to 2 months to see if we can get this under control.  We also discussed that the cough of this length could be within the realm of normal following a viral illness.  Will obtain chest x-ray due to length of illness and pain at base of lungs.  I did discuss with Charisma that this pain is most likely  "diaphragmatic pain from heavy coughing but we will evaluate further.  Problem List Items Addressed This Visit       Asthma    Relevant Medications    fluticasone (Flovent) 110 mcg/actuation inhaler     Other Visit Diagnoses       Subacute cough    -  Primary    Relevant Medications    fluticasone (Flovent) 110 mcg/actuation inhaler    Other Relevant Orders    XR chest 2 views            There are no Patient Instructions on file for this visit.    Follow up: As needed    Return precautions discussed.  An After Visit Summary was given to the patient.  All questions were answered and patient in agreement with plan.    Objective:  /82   Pulse 79   Ht 1.727 m (5' 8\")   Wt 78.8 kg (173 lb 12.8 oz)   SpO2 99%   BMI 26.43 kg/m²     Physical Exam  Vitals and nursing note reviewed.   Constitutional:       General: She is not in acute distress.     Appearance: Normal appearance.   HENT:      Head: Normocephalic and atraumatic.      Mouth/Throat:      Mouth: Mucous membranes are moist.   Eyes:      General: No scleral icterus.        Right eye: No discharge.         Left eye: No discharge.      Extraocular Movements: Extraocular movements intact.      Conjunctiva/sclera: Conjunctivae normal.   Cardiovascular:      Rate and Rhythm: Normal rate and regular rhythm.   Pulmonary:      Effort: Pulmonary effort is normal. No respiratory distress.      Breath sounds: No wheezing.      Comments: No focal lung findings, no crackles no wheezes appreciated  Skin:     General: Skin is warm and dry.   Neurological:      General: No focal deficit present.      Mental Status: She is alert and oriented to person, place, and time.   Psychiatric:         Attention and Perception: Attention normal.         Mood and Affect: Mood normal.         Speech: Speech normal.         Behavior: Behavior normal.         Cognition and Memory: Cognition and memory normal.         Judgment: Judgment normal.         I spent 14 minutes in total time for " this visit including all related clinical activities before, during, and after the visit excluding other billable activities/procedure time.     Kendra Ordaz MD

## 2025-04-25 DIAGNOSIS — K21.9 GASTROESOPHAGEAL REFLUX DISEASE WITHOUT ESOPHAGITIS: ICD-10-CM

## 2025-06-18 DIAGNOSIS — M25.572 PAIN IN JOINTS OF BOTH FEET: ICD-10-CM

## 2025-06-18 DIAGNOSIS — M25.571 PAIN IN JOINTS OF BOTH FEET: ICD-10-CM

## 2025-06-23 RX ORDER — PANTOPRAZOLE SODIUM 40 MG/1
40 TABLET, DELAYED RELEASE ORAL
Qty: 90 TABLET | Refills: 3 | Status: SHIPPED | OUTPATIENT
Start: 2025-06-23 | End: 2026-06-23

## 2025-06-30 ENCOUNTER — PATIENT MESSAGE (OUTPATIENT)
Age: 60
End: 2025-06-30
Payer: COMMERCIAL

## 2025-12-04 ENCOUNTER — APPOINTMENT (OUTPATIENT)
Age: 60
End: 2025-12-04
Payer: COMMERCIAL